# Patient Record
Sex: MALE | Race: BLACK OR AFRICAN AMERICAN | NOT HISPANIC OR LATINO | URBAN - METROPOLITAN AREA
[De-identification: names, ages, dates, MRNs, and addresses within clinical notes are randomized per-mention and may not be internally consistent; named-entity substitution may affect disease eponyms.]

---

## 2018-12-30 ENCOUNTER — EMERGENCY (EMERGENCY)
Facility: HOSPITAL | Age: 63
LOS: 1 days | Discharge: HOME | End: 2018-12-30
Attending: EMERGENCY MEDICINE | Admitting: EMERGENCY MEDICINE
Payer: COMMERCIAL

## 2018-12-30 VITALS
OXYGEN SATURATION: 98 % | HEART RATE: 85 BPM | DIASTOLIC BLOOD PRESSURE: 92 MMHG | TEMPERATURE: 98 F | SYSTOLIC BLOOD PRESSURE: 131 MMHG | RESPIRATION RATE: 18 BRPM

## 2018-12-30 VITALS
DIASTOLIC BLOOD PRESSURE: 88 MMHG | SYSTOLIC BLOOD PRESSURE: 141 MMHG | RESPIRATION RATE: 16 BRPM | OXYGEN SATURATION: 100 % | HEART RATE: 74 BPM

## 2018-12-30 LAB
ALBUMIN SERPL ELPH-MCNC: 4.5 G/DL — SIGNIFICANT CHANGE UP (ref 3.3–5)
ALP SERPL-CCNC: 72 U/L — SIGNIFICANT CHANGE UP (ref 40–120)
ALT FLD-CCNC: 25 U/L — SIGNIFICANT CHANGE UP (ref 4–41)
ANISOCYTOSIS BLD QL: SLIGHT — SIGNIFICANT CHANGE UP
AST SERPL-CCNC: 20 U/L — SIGNIFICANT CHANGE UP (ref 4–40)
BASOPHILS # BLD AUTO: 0.02 K/UL — SIGNIFICANT CHANGE UP (ref 0–0.2)
BASOPHILS NFR BLD AUTO: 0.2 % — SIGNIFICANT CHANGE UP (ref 0–2)
BASOPHILS NFR SPEC: 0.9 % — SIGNIFICANT CHANGE UP (ref 0–2)
BILIRUB SERPL-MCNC: 0.6 MG/DL — SIGNIFICANT CHANGE UP (ref 0.2–1.2)
BLASTS # FLD: 0 % — SIGNIFICANT CHANGE UP (ref 0–0)
BUN SERPL-MCNC: 19 MG/DL — SIGNIFICANT CHANGE UP (ref 7–23)
CALCIUM SERPL-MCNC: 10 MG/DL — SIGNIFICANT CHANGE UP (ref 8.4–10.5)
CHLORIDE SERPL-SCNC: 100 MMOL/L — SIGNIFICANT CHANGE UP (ref 98–107)
CO2 SERPL-SCNC: 24 MMOL/L — SIGNIFICANT CHANGE UP (ref 22–31)
CREAT SERPL-MCNC: 1.11 MG/DL — SIGNIFICANT CHANGE UP (ref 0.5–1.3)
EOSINOPHIL # BLD AUTO: 0.01 K/UL — SIGNIFICANT CHANGE UP (ref 0–0.5)
EOSINOPHIL NFR BLD AUTO: 0.1 % — SIGNIFICANT CHANGE UP (ref 0–6)
EOSINOPHIL NFR FLD: 0 % — SIGNIFICANT CHANGE UP (ref 0–6)
GLUCOSE SERPL-MCNC: 140 MG/DL — HIGH (ref 70–99)
HCT VFR BLD CALC: 42.9 % — SIGNIFICANT CHANGE UP (ref 39–50)
HGB BLD-MCNC: 14.5 G/DL — SIGNIFICANT CHANGE UP (ref 13–17)
IMM GRANULOCYTES # BLD AUTO: 0.03 # — SIGNIFICANT CHANGE UP
IMM GRANULOCYTES NFR BLD AUTO: 0.3 % — SIGNIFICANT CHANGE UP (ref 0–1.5)
LYMPHOCYTES # BLD AUTO: 0.46 K/UL — LOW (ref 1–3.3)
LYMPHOCYTES # BLD AUTO: 3.9 % — LOW (ref 13–44)
LYMPHOCYTES NFR SPEC AUTO: 5.2 % — LOW (ref 13–44)
MCHC RBC-ENTMCNC: 27.3 PG — SIGNIFICANT CHANGE UP (ref 27–34)
MCHC RBC-ENTMCNC: 33.8 % — SIGNIFICANT CHANGE UP (ref 32–36)
MCV RBC AUTO: 80.6 FL — SIGNIFICANT CHANGE UP (ref 80–100)
METAMYELOCYTES # FLD: 0 % — SIGNIFICANT CHANGE UP (ref 0–1)
MICROCYTES BLD QL: SLIGHT — SIGNIFICANT CHANGE UP
MONOCYTES # BLD AUTO: 0.59 K/UL — SIGNIFICANT CHANGE UP (ref 0–0.9)
MONOCYTES NFR BLD AUTO: 5 % — SIGNIFICANT CHANGE UP (ref 2–14)
MONOCYTES NFR BLD: 4.3 % — SIGNIFICANT CHANGE UP (ref 2–9)
MYELOCYTES NFR BLD: 0 % — SIGNIFICANT CHANGE UP (ref 0–0)
NEUTROPHIL AB SER-ACNC: 89.6 % — HIGH (ref 43–77)
NEUTROPHILS # BLD AUTO: 10.8 K/UL — HIGH (ref 1.8–7.4)
NEUTROPHILS NFR BLD AUTO: 90.5 % — HIGH (ref 43–77)
NEUTS BAND # BLD: 0 % — SIGNIFICANT CHANGE UP (ref 0–6)
NRBC # FLD: 0 — SIGNIFICANT CHANGE UP
OTHER - HEMATOLOGY %: 0 — SIGNIFICANT CHANGE UP
OVALOCYTES BLD QL SMEAR: SLIGHT — SIGNIFICANT CHANGE UP
PLATELET # BLD AUTO: 241 K/UL — SIGNIFICANT CHANGE UP (ref 150–400)
PLATELET COUNT - ESTIMATE: NORMAL — SIGNIFICANT CHANGE UP
PMV BLD: 10 FL — SIGNIFICANT CHANGE UP (ref 7–13)
POTASSIUM SERPL-MCNC: 4.4 MMOL/L — SIGNIFICANT CHANGE UP (ref 3.5–5.3)
POTASSIUM SERPL-SCNC: 4.4 MMOL/L — SIGNIFICANT CHANGE UP (ref 3.5–5.3)
PROMYELOCYTES # FLD: 0 % — SIGNIFICANT CHANGE UP (ref 0–0)
PROT SERPL-MCNC: 7.3 G/DL — SIGNIFICANT CHANGE UP (ref 6–8.3)
RBC # BLD: 5.32 M/UL — SIGNIFICANT CHANGE UP (ref 4.2–5.8)
RBC # FLD: 13.2 % — SIGNIFICANT CHANGE UP (ref 10.3–14.5)
REVIEW TO FOLLOW: YES — SIGNIFICANT CHANGE UP
SODIUM SERPL-SCNC: 138 MMOL/L — SIGNIFICANT CHANGE UP (ref 135–145)
VARIANT LYMPHS # BLD: 0 % — SIGNIFICANT CHANGE UP
WBC # BLD: 11.91 K/UL — HIGH (ref 3.8–10.5)
WBC # FLD AUTO: 11.91 K/UL — HIGH (ref 3.8–10.5)

## 2018-12-30 PROCEDURE — 99284 EMERGENCY DEPT VISIT MOD MDM: CPT

## 2018-12-30 PROCEDURE — 70450 CT HEAD/BRAIN W/O DYE: CPT | Mod: 26

## 2018-12-30 RX ORDER — KETOROLAC TROMETHAMINE 30 MG/ML
30 SYRINGE (ML) INJECTION ONCE
Qty: 0 | Refills: 0 | Status: DISCONTINUED | OUTPATIENT
Start: 2018-12-30 | End: 2018-12-30

## 2018-12-30 RX ORDER — SODIUM CHLORIDE 9 MG/ML
1000 INJECTION INTRAMUSCULAR; INTRAVENOUS; SUBCUTANEOUS ONCE
Qty: 0 | Refills: 0 | Status: COMPLETED | OUTPATIENT
Start: 2018-12-30 | End: 2018-12-30

## 2018-12-30 RX ORDER — METOCLOPRAMIDE HCL 10 MG
10 TABLET ORAL ONCE
Qty: 0 | Refills: 0 | Status: COMPLETED | OUTPATIENT
Start: 2018-12-30 | End: 2018-12-30

## 2018-12-30 RX ORDER — DEXAMETHASONE 0.5 MG/5ML
10 ELIXIR ORAL ONCE
Qty: 0 | Refills: 0 | Status: COMPLETED | OUTPATIENT
Start: 2018-12-30 | End: 2018-12-30

## 2018-12-30 RX ORDER — ACETAMINOPHEN 500 MG
650 TABLET ORAL ONCE
Qty: 0 | Refills: 0 | Status: COMPLETED | OUTPATIENT
Start: 2018-12-30 | End: 2018-12-30

## 2018-12-30 RX ORDER — VALPROIC ACID (AS SODIUM SALT) 250 MG/5ML
500 SOLUTION, ORAL ORAL ONCE
Qty: 0 | Refills: 0 | Status: COMPLETED | OUTPATIENT
Start: 2018-12-30 | End: 2018-12-30

## 2018-12-30 RX ADMIN — Medication 30 MILLIGRAM(S): at 15:20

## 2018-12-30 RX ADMIN — Medication 10 MILLIGRAM(S): at 14:01

## 2018-12-30 RX ADMIN — Medication 102 MILLIGRAM(S): at 15:20

## 2018-12-30 RX ADMIN — Medication 650 MILLIGRAM(S): at 14:01

## 2018-12-30 RX ADMIN — Medication 27.5 MILLIGRAM(S): at 16:16

## 2018-12-30 RX ADMIN — SODIUM CHLORIDE 1000 MILLILITER(S): 9 INJECTION INTRAMUSCULAR; INTRAVENOUS; SUBCUTANEOUS at 14:02

## 2018-12-30 NOTE — ED PROVIDER NOTE - ATTENDING CONTRIBUTION TO CARE
I, Jennifer Cabot, MD, have performed a history and physical exam of the patient and discussed their management with the resident. I reviewed the resident's note and agree with the documented findings and plan of care. My medical decision making and observations are found above.    Cabot: 63M with PMH of HTN, carotid artery disease on asa, and migraines, who comes in with orbital HA that started gradually at 2am today.  Similar to prior HAs.  + NBNB vomiting, photophobia.  No neck stiffness, numbness, weakness, LOC.  On exam, HDS, NAD, MMM, eyes clear, lungs CTAB, heart sounds normal, abd soft, NT, ND, no CVAT, LEs without edema, wwp, skin normal temperature and color, neuro: AAOx3, PERRLA, EOMIB, CN 2-12 intact, 5/5 strength, SILT, no drift, VIKAS intact.  Likely migraine, very unlikely ICH or mass.  Given age, will check CT head.  Will tx sx.

## 2018-12-30 NOTE — ED ADULT NURSE NOTE - OBJECTIVE STATEMENT
pt brought to rm 25 A&ox3, skin w/d/i, amb @ baseline, c/o HA associated w/ vomiting/photophobia since approx 3 am,  hx of HA before, similar however not as bad, states has had approx 3 prior episodes usually resolved w/ sleep however was unable to sleep through pain today, denies taking OTC pain meds prior to arrival, states missed multiple days of HTN meds last week however has been taking them for past 4-5 days prior to onset of symptoms, no neuro deficits noted on presentation, wife @ bedside states pt @ baseline health/mental status aside from pain, SL placed, labs sent, awaiting MD eval/results/further orders, will continue to monitor.  (break)

## 2018-12-30 NOTE — ED ADULT TRIAGE NOTE - CHIEF COMPLAINT QUOTE
p/t c/o of sudden onset of headaches, and vomiting since this am with mild photophobia as well, p/t appears uncomfortable

## 2018-12-30 NOTE — ED PROVIDER NOTE - MEDICAL DECISION MAKING DETAILS
62yo M h/o HTN, carotid artery disease on asa p/w HA and vomiting. unremarkable exam. likely migraine however given older age and severity of HA, will get CT to eval for masses. laabs, symptomatic control. 62yo M h/o HTN, carotid artery disease on asa p/w HA and vomiting. unremarkable exam. likely migraine however given older age and severity of HA, will get CT to eval for masses. labs, symptomatic control.    Cabot: 63M with PMH of HTN, carotid artery disease on asa, and migraines, who comes in with orbital HA that started gradually at 2am today.  Similar to prior HAs.  + NBNB vomiting, photophobia.  No neck stiffness, numbness, weakness, LOC.  On exam, HDS, NAD, MMM, eyes clear, lungs CTAB, heart sounds normal, abd soft, NT, ND, no CVAT, LEs without edema, wwp, skin normal temperature and color, neuro: AAOx3, PERRLA, EOMIB, CN 2-12 intact, 5/5 strength, SILT, no drift, VIKAS intact.  Likely migraine, very unlikely ICH or mass.  Given age, will check CT head.  Will tx sx.

## 2018-12-30 NOTE — ED PROVIDER NOTE - NS ED ROS FT
Constitutional: no fevers, chills  HEENT: +HA, no visual changes, no sore throat, no rhinorrhea  CV: no cp  Resp: no sob  GI: +vomiting, +vomiting, no abd pain/diarrhea/constipation  : no dysuria, hematuria  MSK: no joint pains  skin: no rashes  neuro: no HA, no confusion  psych: no SI/HI  heme: no LAD

## 2018-12-30 NOTE — ED PROVIDER NOTE - NSFOLLOWUPINSTRUCTIONS_ED_ALL_ED_FT
1) Please follow-up with a neurologist within the next 3 days.  Please call today or tomorrow for an appointment.  If you cannot follow-up with your doctor(s), please return to the ED for any urgent issues.  2) If you have any worsening of symptoms or any other concerns please return to the ED immediately.  3) Please continue taking your home medications as directed.  4) You may have been given a copy of your labs and/or imaging.  Please go over these with your primary care doctor.

## 2018-12-30 NOTE — ED PROVIDER NOTE - OBJECTIVE STATEMENT
64yo M h/o HTN, carotid artery disease on asa p/w HA and vomiting. HA is behind the eyes, started acutely, + photophobia, + vomiting. Has had h/o HA before, similar in nature but not as severe. Usually able to sleep it off. Hasn't taken anything for the pain. Denies f/c, neck pain, numb/tingling/weakness, slurred speech, confusion, head trauma. 62yo M h/o HTN, carotid artery disease on asa, and migraines, p/w HA and vomiting. HA is behind the eyes, started acutely, + photophobia, + vomiting. Has had h/o HA before, similar in nature. Usually able to sleep it off. Hasn't taken anything for the pain. Denies f/c, neck pain, numb/tingling/weakness, slurred speech, confusion, head trauma.

## 2018-12-30 NOTE — ED PROVIDER NOTE - PHYSICAL EXAMINATION
Vitals: WNL  Gen: laying comfortably in NAD  Head: NCAT  ENT: sclerae white, anicterus, moist mucous membranes. No exudates. Neck supple, no LAD,  no carotid bruits, no JVD  CV: RRR. Audible S1 and S2. No murmurs, rubs, gallops, S3, nor S4, 2+ radial and DP pulses   Pulm: Clear to auscultation bilaterally. No wheezes, rales, or rhonchi  Abd: soft, normoactive BS x4, NTND, no rebound, no guarding, no rashes  Musculoskeletal:  No peripheral edema  Skin: no lesions or scars noted  Neurologic: AAOx3, CN2-12 intact, EOMI, PERRLA, finger to nose intact, VIKAS intact, gait intact, heel to shin intact  : no CVA tenderness  Psych: normal affect

## 2019-07-01 ENCOUNTER — INPATIENT (INPATIENT)
Facility: HOSPITAL | Age: 64
LOS: 4 days | Discharge: HOME | End: 2019-07-06
Attending: INTERNAL MEDICINE | Admitting: INTERNAL MEDICINE
Payer: COMMERCIAL

## 2019-07-01 VITALS
HEART RATE: 86 BPM | TEMPERATURE: 98 F | RESPIRATION RATE: 16 BRPM | SYSTOLIC BLOOD PRESSURE: 141 MMHG | OXYGEN SATURATION: 100 % | DIASTOLIC BLOOD PRESSURE: 88 MMHG

## 2019-07-01 DIAGNOSIS — Z29.9 ENCOUNTER FOR PROPHYLACTIC MEASURES, UNSPECIFIED: ICD-10-CM

## 2019-07-01 DIAGNOSIS — I10 ESSENTIAL (PRIMARY) HYPERTENSION: ICD-10-CM

## 2019-07-01 DIAGNOSIS — R94.5 ABNORMAL RESULTS OF LIVER FUNCTION STUDIES: ICD-10-CM

## 2019-07-01 DIAGNOSIS — L03.115 CELLULITIS OF RIGHT LOWER LIMB: ICD-10-CM

## 2019-07-01 DIAGNOSIS — E78.5 HYPERLIPIDEMIA, UNSPECIFIED: ICD-10-CM

## 2019-07-01 DIAGNOSIS — L03.90 CELLULITIS, UNSPECIFIED: ICD-10-CM

## 2019-07-01 LAB
ALBUMIN SERPL ELPH-MCNC: 4.3 G/DL — SIGNIFICANT CHANGE UP (ref 3.3–5)
ALP SERPL-CCNC: 192 U/L — HIGH (ref 40–120)
ALT FLD-CCNC: 152 U/L — HIGH (ref 4–41)
ANION GAP SERPL CALC-SCNC: 10 MMO/L — SIGNIFICANT CHANGE UP (ref 7–14)
AST SERPL-CCNC: 97 U/L — HIGH (ref 4–40)
BASOPHILS # BLD AUTO: 0.03 K/UL — SIGNIFICANT CHANGE UP (ref 0–0.2)
BASOPHILS NFR BLD AUTO: 0.3 % — SIGNIFICANT CHANGE UP (ref 0–2)
BILIRUB SERPL-MCNC: 0.4 MG/DL — SIGNIFICANT CHANGE UP (ref 0.2–1.2)
BUN SERPL-MCNC: 12 MG/DL — SIGNIFICANT CHANGE UP (ref 7–23)
CALCIUM SERPL-MCNC: 10 MG/DL — SIGNIFICANT CHANGE UP (ref 8.4–10.5)
CHLORIDE SERPL-SCNC: 99 MMOL/L — SIGNIFICANT CHANGE UP (ref 98–107)
CO2 SERPL-SCNC: 29 MMOL/L — SIGNIFICANT CHANGE UP (ref 22–31)
CREAT SERPL-MCNC: 1.08 MG/DL — SIGNIFICANT CHANGE UP (ref 0.5–1.3)
EOSINOPHIL # BLD AUTO: 0.14 K/UL — SIGNIFICANT CHANGE UP (ref 0–0.5)
EOSINOPHIL NFR BLD AUTO: 1.6 % — SIGNIFICANT CHANGE UP (ref 0–6)
GLUCOSE SERPL-MCNC: 102 MG/DL — HIGH (ref 70–99)
HCT VFR BLD CALC: 40.3 % — SIGNIFICANT CHANGE UP (ref 39–50)
HGB BLD-MCNC: 13.4 G/DL — SIGNIFICANT CHANGE UP (ref 13–17)
IMM GRANULOCYTES NFR BLD AUTO: 0.5 % — SIGNIFICANT CHANGE UP (ref 0–1.5)
LYMPHOCYTES # BLD AUTO: 0.77 K/UL — LOW (ref 1–3.3)
LYMPHOCYTES # BLD AUTO: 8.8 % — LOW (ref 13–44)
MCHC RBC-ENTMCNC: 26.9 PG — LOW (ref 27–34)
MCHC RBC-ENTMCNC: 33.3 % — SIGNIFICANT CHANGE UP (ref 32–36)
MCV RBC AUTO: 80.8 FL — SIGNIFICANT CHANGE UP (ref 80–100)
MONOCYTES # BLD AUTO: 0.9 K/UL — SIGNIFICANT CHANGE UP (ref 0–0.9)
MONOCYTES NFR BLD AUTO: 10.3 % — SIGNIFICANT CHANGE UP (ref 2–14)
NEUTROPHILS # BLD AUTO: 6.89 K/UL — SIGNIFICANT CHANGE UP (ref 1.8–7.4)
NEUTROPHILS NFR BLD AUTO: 78.5 % — HIGH (ref 43–77)
NRBC # FLD: 0 K/UL — SIGNIFICANT CHANGE UP (ref 0–0)
PLATELET # BLD AUTO: 246 K/UL — SIGNIFICANT CHANGE UP (ref 150–400)
PMV BLD: 10.2 FL — SIGNIFICANT CHANGE UP (ref 7–13)
POTASSIUM SERPL-MCNC: 4.1 MMOL/L — SIGNIFICANT CHANGE UP (ref 3.5–5.3)
POTASSIUM SERPL-SCNC: 4.1 MMOL/L — SIGNIFICANT CHANGE UP (ref 3.5–5.3)
PROT SERPL-MCNC: 7.2 G/DL — SIGNIFICANT CHANGE UP (ref 6–8.3)
RBC # BLD: 4.99 M/UL — SIGNIFICANT CHANGE UP (ref 4.2–5.8)
RBC # FLD: 13.2 % — SIGNIFICANT CHANGE UP (ref 10.3–14.5)
SODIUM SERPL-SCNC: 138 MMOL/L — SIGNIFICANT CHANGE UP (ref 135–145)
WBC # BLD: 8.77 K/UL — SIGNIFICANT CHANGE UP (ref 3.8–10.5)
WBC # FLD AUTO: 8.77 K/UL — SIGNIFICANT CHANGE UP (ref 3.8–10.5)

## 2019-07-01 PROCEDURE — 99223 1ST HOSP IP/OBS HIGH 75: CPT | Mod: AI

## 2019-07-01 PROCEDURE — 73630 X-RAY EXAM OF FOOT: CPT | Mod: 26,RT

## 2019-07-01 RX ORDER — LOSARTAN POTASSIUM 100 MG/1
1 TABLET, FILM COATED ORAL
Qty: 0 | Refills: 0 | DISCHARGE

## 2019-07-01 RX ORDER — ASPIRIN/CALCIUM CARB/MAGNESIUM 324 MG
1 TABLET ORAL
Qty: 0 | Refills: 0 | DISCHARGE

## 2019-07-01 RX ORDER — ROSUVASTATIN CALCIUM 5 MG/1
1 TABLET ORAL
Qty: 0 | Refills: 0 | DISCHARGE

## 2019-07-01 RX ORDER — HYDROCHLOROTHIAZIDE 25 MG
12.5 TABLET ORAL DAILY
Refills: 0 | Status: DISCONTINUED | OUTPATIENT
Start: 2019-07-01 | End: 2019-07-06

## 2019-07-01 RX ORDER — LOSARTAN POTASSIUM 100 MG/1
50 TABLET, FILM COATED ORAL DAILY
Refills: 0 | Status: DISCONTINUED | OUTPATIENT
Start: 2019-07-01 | End: 2019-07-06

## 2019-07-01 RX ORDER — PREGABALIN 225 MG/1
1000 CAPSULE ORAL DAILY
Refills: 0 | Status: DISCONTINUED | OUTPATIENT
Start: 2019-07-01 | End: 2019-07-06

## 2019-07-01 RX ORDER — ATORVASTATIN CALCIUM 80 MG/1
40 TABLET, FILM COATED ORAL AT BEDTIME
Refills: 0 | Status: DISCONTINUED | OUTPATIENT
Start: 2019-07-01 | End: 2019-07-02

## 2019-07-01 RX ORDER — CHOLECALCIFEROL (VITAMIN D3) 125 MCG
1 CAPSULE ORAL
Qty: 0 | Refills: 0 | DISCHARGE

## 2019-07-01 RX ORDER — HEPARIN SODIUM 5000 [USP'U]/ML
5000 INJECTION INTRAVENOUS; SUBCUTANEOUS EVERY 8 HOURS
Refills: 0 | Status: DISCONTINUED | OUTPATIENT
Start: 2019-07-01 | End: 2019-07-06

## 2019-07-01 RX ORDER — PREGABALIN 225 MG/1
1 CAPSULE ORAL
Qty: 0 | Refills: 0 | DISCHARGE

## 2019-07-01 RX ORDER — TETANUS TOXOID, REDUCED DIPHTHERIA TOXOID AND ACELLULAR PERTUSSIS VACCINE, ADSORBED 5; 2.5; 8; 8; 2.5 [IU]/.5ML; [IU]/.5ML; UG/.5ML; UG/.5ML; UG/.5ML
0.5 SUSPENSION INTRAMUSCULAR ONCE
Refills: 0 | Status: COMPLETED | OUTPATIENT
Start: 2019-07-01 | End: 2019-07-01

## 2019-07-01 RX ORDER — ASPIRIN/CALCIUM CARB/MAGNESIUM 324 MG
81 TABLET ORAL DAILY
Refills: 0 | Status: DISCONTINUED | OUTPATIENT
Start: 2019-07-01 | End: 2019-07-06

## 2019-07-01 RX ADMIN — TETANUS TOXOID, REDUCED DIPHTHERIA TOXOID AND ACELLULAR PERTUSSIS VACCINE, ADSORBED 0.5 MILLILITER(S): 5; 2.5; 8; 8; 2.5 SUSPENSION INTRAMUSCULAR at 12:14

## 2019-07-01 RX ADMIN — HEPARIN SODIUM 5000 UNIT(S): 5000 INJECTION INTRAVENOUS; SUBCUTANEOUS at 21:37

## 2019-07-01 RX ADMIN — Medication 100 MILLIGRAM(S): at 10:48

## 2019-07-01 RX ADMIN — Medication 100 MILLIGRAM(S): at 21:34

## 2019-07-01 NOTE — ED PROVIDER NOTE - CLINICAL SUMMARY MEDICAL DECISION MAKING FREE TEXT BOX
Pt Pt p/w worsening likely cellulitis over R foot, expanding in size and edema despite 3 days of PO antibx. Pt does not have DM or other immunocompromise but in setting of failutre of o/p tx, will likely require IV antibx. CDU vs admission. Pt appears otherwise well, does not appear septic, vitals stable. Basic blood to be order work but sepsis w/u not necessary at this time.

## 2019-07-01 NOTE — H&P ADULT - NSHPSOCIALHISTORY_GEN_ALL_CORE
Employed as an , Lives with wife and children, ambulates without asisstance, independently attends to adls, denies EtOH, and tobacco use. Employed as an , Lives with wife and children, ambulates without assistance, independently attends to adls, denies EtOH, and tobacco use.

## 2019-07-01 NOTE — ED PROVIDER NOTE - ATTENDING CONTRIBUTION TO CARE
I, Jennifer Cabot, MD, have performed a history and physical exam of the patient and discussed their management with the resident. I reviewed the resident's note and agree with the documented findings and plan of care. My medical decision making and observations are found above.    Cabot: 67M with PMH of HTN, carotid artery disease on asa, and migraines, here with worsening R foot edema, erythema, and pain x 5d.  Started with small abrasion between 4th and 5th toes, no known trauma.  Has been on keflex without improvement.  No F/C.  On exam, HDS, NAD, MMM, eyes clear, lungs CTAB, heart sounds normal, abd soft, NT, ND, no CVAT, RLE - erythema and edema from foot to midshin, warm to touch, no abrasions or lacerations on the foot noted, LLE without edema, wwp, skin normal temperature and color, neuro: alert and oriented, no focal deficits.  WIll treat with IV clinda, check XR, give tdap, admit.

## 2019-07-01 NOTE — ED PROVIDER NOTE - OBJECTIVE STATEMENT
62yo M h/o HTN, carotid artery disease on asa, and migraines, p/w worsening erythema and swelling of L foot. Pt has been on Keflex for the last 3 days, prescribed by PMD; the rash has continued to grow, alongside the swelling. Small amount of edema of dorsal surface of foot with spread laterally and superiorly. Pt with mild pain to area. Unknown if foot sustained trauma prior to development of rash; pt states the rash began 4-5 days ago. Pt denies systemic symptoms. 64yo M h/o HTN, carotid artery disease on asa, and migraines, p/w worsening erythema and swelling of R foot. Pt has been on Keflex for the last 3 days, prescribed by PMD; the rash has continued to grow, alongside the swelling. Small amount of edema of dorsal surface of foot with spread laterally and superiorly. Pt with mild pain to area. Unknown if foot sustained trauma prior to development of rash; pt states the rash began 4-5 days ago. Pt denies systemic symptoms.

## 2019-07-01 NOTE — H&P ADULT - NSHPPHYSICALEXAM_GEN_ALL_CORE
Vital Signs Last 24 Hrs  T(C): 36.8 (01 Jul 2019 12:58), Max: 36.9 (01 Jul 2019 09:41)  T(F): 98.3 (01 Jul 2019 12:58), Max: 98.4 (01 Jul 2019 09:41)  HR: 73 (01 Jul 2019 12:58) (73 - 86)  BP: 122/87 (01 Jul 2019 12:58) (122/87 - 141/88)  BP(mean): --  RR: 17 (01 Jul 2019 12:58) (16 - 17)  SpO2: 100% (01 Jul 2019 12:58) (100% - 100%)  I&O's Summary      PHYSICAL EXAM:  GENERAL: NAD, well-groomed, well-developed  HEAD:  Atraumatic, Normocephalic  EYES: EOMI, PERRLA, conjunctiva and sclera clear  ENMT: No tonsillar erythema, exudates, or enlargement; Moist mucous membranes, Good dentition  NECK: Supple, No JVD  NERVOUS SYSTEM: AOX3, motor and sensation grossly intact in b/l UE and b/l LE  PSYCHIATRIC: Appropriate affect and mood  CHEST/LUNG: Clear to auscultation bilaterally; No rales, rhonchi, wheezing, or rubs  HEART: Regular rate and rhythm; No murmurs, rubs, or gallops. No LE edema  ABDOMEN: Soft, Nontender, Nondistended; Bowel sounds present  EXTREMITIES:  2+ Peripheral Pulses, No clubbing, cyanosis  SKIN: No rashes or lesions Vital Signs Last 24 Hrs  T(C): 36.8 (01 Jul 2019 12:58), Max: 36.9 (01 Jul 2019 09:41)  T(F): 98.3 (01 Jul 2019 12:58), Max: 98.4 (01 Jul 2019 09:41)  HR: 73 (01 Jul 2019 12:58) (73 - 86)  BP: 122/87 (01 Jul 2019 12:58) (122/87 - 141/88)  RR: 17 (01 Jul 2019 12:58) (16 - 17)  SpO2: 100% (01 Jul 2019 12:58) (100% - 100%)    PHYSICAL EXAM:  GENERAL: NAD, well-groomed, well-developed  HEAD:  Atraumatic, Normocephalic  EYES: EOMI, PERRLA, conjunctiva and sclera clear  ENMT: No tonsillar erythema, exudates, or enlargement; Moist mucous membranes, Good dentition  NECK: Supple, No JVD  NERVOUS SYSTEM: AOX3, motor and sensation grossly intact in b/l UE and b/l LE  PSYCHIATRIC: Appropriate affect and mood  CHEST/LUNG: Clear to auscultation bilaterally; No rales, rhonchi, wheezing, or rubs  HEART: Regular rate and rhythm; No murmurs, rubs, or gallops. No LE edema  ABDOMEN: Soft, Nontender, Nondistended; Bowel sounds present  EXTREMITIES:  2+ Peripheral Pulses, No clubbing, cyanosis  SKIN: RLE erythematous, warm to touch, non tender, grossly swollen

## 2019-07-01 NOTE — H&P ADULT - PROBLEM SELECTOR PLAN 4
Patient with elevated Alk Phos, AST/ALT unclear etiology  - will Trend CMP  - will defer further work up as outpatient Patient with elevated Alk Phos, AST/ALT unclear etiology  - will Trend CMP, if continues to rise consider Abdominal US   - otherwise will defer further work up as outpatient

## 2019-07-01 NOTE — ED PROVIDER NOTE - PROGRESS NOTE DETAILS
CDU full, pt likely does require IV antibx for worsening of cellulitis despite o/p tx, pt now tba for tx -Tamika ,pgy3

## 2019-07-01 NOTE — H&P ADULT - NSHPREVIEWOFSYSTEMS_GEN_ALL_CORE
CONSTITUTIONAL: No fever, weight loss, or fatigue  EYES: No eye pain, visual disturbances, or discharge  ENMT:  No difficulty hearing, tinnitus, vertigo; No sinus or throat pain  RESPIRATORY: No cough, wheezing, chills or hemoptysis; No shortness of breath  CARDIOVASCULAR: No chest pain, palpitations, dizziness, or leg swelling  GASTROINTESTINAL: No abdominal or epigastric pain. No nausea, vomiting, or hematemesis; No diarrhea or constipation. No melena or hematochezia.  GENITOURINARY: No dysuria, frequency, hematuria, or incontinence  NEUROLOGICAL: No headaches, loss of strength, numbness, or tremors  SKIN: No itching, burning, rashes, or lesions   LYMPH NODES: No enlarged glands  ENDOCRINE: No heat or cold intolerance; No polydipsia or polyuria  MUSCULOSKELETAL: No joint pain or swelling;   PSYCHIATRIC: Denies depression, anxiety  HEME/LYMPH: No easy bruising, or bleeding gums  ALLERGY AND IMMUNOLOGIC: No hives or eczema CONSTITUTIONAL: No fever, weight loss, or fatigue  EYES: No eye pain, visual disturbances, or discharge  ENMT: No difficulty hearing, tinnitus, vertigo; No sinus or throat pain  RESPIRATORY: No cough, wheezing, chills or hemoptysis; No shortness of breath  CARDIOVASCULAR: No chest pain, palpitations, dizziness  GASTROINTESTINAL: No abdominal or epigastric pain. No nausea, vomiting, or hematemesis; No diarrhea or constipation. No melena or hematochezia.  GENITOURINARY: No dysuria, frequency, hematuria, or incontinence  NEUROLOGICAL: No headaches, loss of strength, numbness, or tremors  SKIN: Erythema, pain, and swelling of the RLE   LYMPH NODES: No enlarged glands  ENDOCRINE: No heat or cold intolerance; No polydipsia or polyuria  MUSCULOSKELETAL: No joint pain or swelling;   PSYCHIATRIC: Denies depression, anxiety  HEME/LYMPH: No easy bruising, or bleeding gums  ALLERGY AND IMMUNOLOGIC: No hives or eczema

## 2019-07-01 NOTE — H&P ADULT - PROBLEM SELECTOR PLAN 1
S/P 3 days of keflex with reported non improvement   s/p 900mg clindamycin x1   - c/w S/P 3 days of keflex with reported non improvement   Xray without foci of air or bony destruction   s/p 900mg clindamycin x1 in ED  - c/w clindamycin 900mg IV Q8hrs   - can transition to PO with clinical improvement

## 2019-07-01 NOTE — ED ADULT NURSE NOTE - OBJECTIVE STATEMENT
64 yo male, ambulatory, c/o 3 days redness, warmth, swelling, pain to right foot. foot appears tight, swollen, warm to touch. pt reports having minor skin breakdown between toes and after that redness/swelling began. pt denies fever, chest pain, sob, n/v/d, dysuria. 20g iv insert to right ac. labs sent as ordered. pt to be medicated w IV ABX

## 2019-07-01 NOTE — H&P ADULT - HISTORY OF PRESENT ILLNESS
64 yo M PMHx of HTN, CAD?, migraines, 62 yo M PMHx of HTN, CAD?, migraines presenting with RLE pain and swelling for 5 days. He presented to his PMD last week with RLE pain and swelling, erythema; started on Keflex which he continued for 3 days. Denies loss of sensation and/or muscle weakness. Sent into ED for further eval by PMD. 62 yo M PMHx of HTN, CAD?, migraines presenting with RLE pain and swelling for 5 days. He presented to his PMD last week with RLE pain and swelling, erythema; started on Keflex which he continued for 3 days. Denies loss of sensation and/or muscle weakness, further denies trauma to the affected joint. No fevers/chills, or sick contacts. Sent into ED for further eval by PMD due to non improvement. Reports that his RLE swelling is markedly improved compared to how it looked 4 days ago.     In the ED  VS: Afebrile, HR 73-86, -147/88, RR 17, spO2 100% RA   Clindamycin 900mg IV x1 64 yo M PMHx of HTN, Carotid artery disease, migraines presenting with RLE pain and swelling for 5 days. He presented to his PMD last week with RLE pain and swelling, erythema; started on Keflex which he continued for 3 days. Denies loss of sensation and/or muscle weakness, further denies trauma to the affected joint. No fevers/chills, or sick contacts. Sent into ED for further eval by PMD due to non improvement. Reports that his RLE swelling is markedly improved compared to how it looked 4 days ago.     In the ED  VS: Afebrile, HR 73-86, -147/88, RR 17, spO2 100% RA   Clindamycin 900mg IV x1, tdap vaccine given

## 2019-07-01 NOTE — H&P ADULT - ATTENDING COMMENTS
63M w/PMH of carotid artery disease (ASA), HTN, migraines sent in by PMD for R foot cellulitis after failing PO Keflex.     #R foot cellulitis: edema, erythema and mild TTP of area. Xray w/out air or bony destruction.   -C/w IV Clindamycin, can transition to PO abx upon dc   -Tdap given in ED     #Elevated LFTs: new this admission   -Trend CMP  -Obtain collateral from PMD regarding Abdominal US and workup    Discussed plan of care with patient’s PMD, Dr. Bliss. 63M w/PMH of carotid artery disease (ASA), HTN, migraines sent in by PMD for R foot cellulitis after failing PO Keflex.     #R foot cellulitis: edema, erythema and mild TTP of area, ROM fully intact, sensation intact. Xray w/out air or bony destruction.   -C/w IV Clindamycin, can transition to PO abx upon dc   -Tdap given in ED     #Elevated LFTs: new this admission   -Trend CMP  -Obtain collateral from PMD regarding Abdominal US and workup    Discussed plan of care with patient’s PMD, Dr. Bliss.

## 2019-07-01 NOTE — H&P ADULT - NSICDXPASTMEDICALHX_GEN_ALL_CORE_FT
PAST MEDICAL HISTORY:  No pertinent past medical history PAST MEDICAL HISTORY:  HLD (hyperlipidemia)     Hypertension

## 2019-07-01 NOTE — ED ADULT NURSE REASSESSMENT NOTE - NS ED NURSE REASSESS COMMENT FT1
Pt received resting in stretcher. Was viewed ambulating without discomfort. Pt states he currently has no pain in his right foot after the medications he received. Will continue to monitor.

## 2019-07-01 NOTE — ED ADULT TRIAGE NOTE - CHIEF COMPLAINT QUOTE
Pt sent in by MD for IV antibiotics, 3 days into PO antibiotics for right foot cellulitis. Denies fever/chills/nvd/DM.

## 2019-07-01 NOTE — H&P ADULT - NSHPLABSRESULTS_GEN_ALL_CORE
LABS:                        13.4   8.77  )-----------( 246      ( 01 Jul 2019 10:40 )             40.3     01 Jul 2019 10:40    138    |  99     |  12     ----------------------------<  102    4.1     |  29     |  1.08     Ca    10.0       01 Jul 2019 10:40    TPro  7.2    /  Alb  4.3    /  TBili  0.4    /  DBili  x      /  AST  97     /  ALT  152    /  AlkPhos  192    01 Jul 2019 10:40      CAPILLARY BLOOD GLUCOSE        BLOOD CULTURE    RADIOLOGY & ADDITIONAL TESTS:    Imaging Personally Reviewed:  [ ] YES LABS:                        13.4   8.77  )-----------( 246      ( 01 Jul 2019 10:40 )             40.3     01 Jul 2019 10:40    138    |  99     |  12     ----------------------------<  102    4.1     |  29     |  1.08     Ca    10.0       01 Jul 2019 10:40    TPro  7.2    /  Alb  4.3    /  TBili  0.4    /  DBili  x      /  AST  97     /  ALT  152    /  AlkPhos  192    01 Jul 2019 10:40    RADIOLOGY & ADDITIONAL TESTS:  < from: Xray Foot AP + Lateral + Oblique, Right (07.01.19 @ 12:08) >    INTERPRETATION:   There is no acute fracture or dislocation. No subcutaneous air or bone   destruction to indicate osteomyelitis. Soft tissues are intact.  COMPARISON:  None available  IMPRESSION:  No acute bone or joint disease.  < end of copied text >    Imaging Personally Reviewed:  [x] YES

## 2019-07-02 LAB
ALBUMIN SERPL ELPH-MCNC: 3.7 G/DL — SIGNIFICANT CHANGE UP (ref 3.3–5)
ALP SERPL-CCNC: 179 U/L — HIGH (ref 40–120)
ALT FLD-CCNC: 104 U/L — HIGH (ref 4–41)
ANION GAP SERPL CALC-SCNC: 11 MMO/L — SIGNIFICANT CHANGE UP (ref 7–14)
AST SERPL-CCNC: 46 U/L — HIGH (ref 4–40)
BASOPHILS # BLD AUTO: 0.02 K/UL — SIGNIFICANT CHANGE UP (ref 0–0.2)
BASOPHILS NFR BLD AUTO: 0.3 % — SIGNIFICANT CHANGE UP (ref 0–2)
BILIRUB SERPL-MCNC: 0.7 MG/DL — SIGNIFICANT CHANGE UP (ref 0.2–1.2)
BUN SERPL-MCNC: 11 MG/DL — SIGNIFICANT CHANGE UP (ref 7–23)
CALCIUM SERPL-MCNC: 9.9 MG/DL — SIGNIFICANT CHANGE UP (ref 8.4–10.5)
CHLORIDE SERPL-SCNC: 99 MMOL/L — SIGNIFICANT CHANGE UP (ref 98–107)
CO2 SERPL-SCNC: 26 MMOL/L — SIGNIFICANT CHANGE UP (ref 22–31)
CREAT SERPL-MCNC: 0.97 MG/DL — SIGNIFICANT CHANGE UP (ref 0.5–1.3)
EOSINOPHIL # BLD AUTO: 0.2 K/UL — SIGNIFICANT CHANGE UP (ref 0–0.5)
EOSINOPHIL NFR BLD AUTO: 2.8 % — SIGNIFICANT CHANGE UP (ref 0–6)
GLUCOSE SERPL-MCNC: 101 MG/DL — HIGH (ref 70–99)
HCT VFR BLD CALC: 39.1 % — SIGNIFICANT CHANGE UP (ref 39–50)
HCV AB S/CO SERPL IA: 0.09 S/CO — SIGNIFICANT CHANGE UP (ref 0–0.99)
HCV AB SERPL-IMP: SIGNIFICANT CHANGE UP
HGB BLD-MCNC: 13 G/DL — SIGNIFICANT CHANGE UP (ref 13–17)
IMM GRANULOCYTES NFR BLD AUTO: 0.4 % — SIGNIFICANT CHANGE UP (ref 0–1.5)
LYMPHOCYTES # BLD AUTO: 0.74 K/UL — LOW (ref 1–3.3)
LYMPHOCYTES # BLD AUTO: 10.5 % — LOW (ref 13–44)
MAGNESIUM SERPL-MCNC: 2 MG/DL — SIGNIFICANT CHANGE UP (ref 1.6–2.6)
MCHC RBC-ENTMCNC: 27 PG — SIGNIFICANT CHANGE UP (ref 27–34)
MCHC RBC-ENTMCNC: 33.2 % — SIGNIFICANT CHANGE UP (ref 32–36)
MCV RBC AUTO: 81.1 FL — SIGNIFICANT CHANGE UP (ref 80–100)
MONOCYTES # BLD AUTO: 0.83 K/UL — SIGNIFICANT CHANGE UP (ref 0–0.9)
MONOCYTES NFR BLD AUTO: 11.8 % — SIGNIFICANT CHANGE UP (ref 2–14)
NEUTROPHILS # BLD AUTO: 5.21 K/UL — SIGNIFICANT CHANGE UP (ref 1.8–7.4)
NEUTROPHILS NFR BLD AUTO: 74.2 % — SIGNIFICANT CHANGE UP (ref 43–77)
NRBC # FLD: 0 K/UL — SIGNIFICANT CHANGE UP (ref 0–0)
PHOSPHATE SERPL-MCNC: 2.7 MG/DL — SIGNIFICANT CHANGE UP (ref 2.5–4.5)
PLATELET # BLD AUTO: 227 K/UL — SIGNIFICANT CHANGE UP (ref 150–400)
PMV BLD: 10.1 FL — SIGNIFICANT CHANGE UP (ref 7–13)
POTASSIUM SERPL-MCNC: 4.9 MMOL/L — SIGNIFICANT CHANGE UP (ref 3.5–5.3)
POTASSIUM SERPL-SCNC: 4.9 MMOL/L — SIGNIFICANT CHANGE UP (ref 3.5–5.3)
PROT SERPL-MCNC: 6.5 G/DL — SIGNIFICANT CHANGE UP (ref 6–8.3)
RBC # BLD: 4.82 M/UL — SIGNIFICANT CHANGE UP (ref 4.2–5.8)
RBC # FLD: 12.9 % — SIGNIFICANT CHANGE UP (ref 10.3–14.5)
SODIUM SERPL-SCNC: 136 MMOL/L — SIGNIFICANT CHANGE UP (ref 135–145)
WBC # BLD: 7.03 K/UL — SIGNIFICANT CHANGE UP (ref 3.8–10.5)
WBC # FLD AUTO: 7.03 K/UL — SIGNIFICANT CHANGE UP (ref 3.8–10.5)

## 2019-07-02 RX ORDER — LACTOBACILLUS ACIDOPHILUS 100MM CELL
1 CAPSULE ORAL
Refills: 0 | Status: DISCONTINUED | OUTPATIENT
Start: 2019-07-02 | End: 2019-07-06

## 2019-07-02 RX ADMIN — HEPARIN SODIUM 5000 UNIT(S): 5000 INJECTION INTRAVENOUS; SUBCUTANEOUS at 13:35

## 2019-07-02 RX ADMIN — Medication 100 MILLIGRAM(S): at 13:33

## 2019-07-02 RX ADMIN — PREGABALIN 1000 MICROGRAM(S): 225 CAPSULE ORAL at 13:33

## 2019-07-02 RX ADMIN — Medication 12.5 MILLIGRAM(S): at 06:01

## 2019-07-02 RX ADMIN — Medication 81 MILLIGRAM(S): at 13:34

## 2019-07-02 RX ADMIN — Medication 1 TABLET(S): at 17:32

## 2019-07-02 RX ADMIN — LOSARTAN POTASSIUM 50 MILLIGRAM(S): 100 TABLET, FILM COATED ORAL at 06:01

## 2019-07-02 RX ADMIN — Medication 100 MILLIGRAM(S): at 06:03

## 2019-07-02 RX ADMIN — HEPARIN SODIUM 5000 UNIT(S): 5000 INJECTION INTRAVENOUS; SUBCUTANEOUS at 06:01

## 2019-07-03 LAB
ALBUMIN SERPL ELPH-MCNC: 3.8 G/DL — SIGNIFICANT CHANGE UP (ref 3.3–5)
ALP SERPL-CCNC: 185 U/L — HIGH (ref 40–120)
ALT FLD-CCNC: 88 U/L — HIGH (ref 4–41)
AST SERPL-CCNC: 40 U/L — SIGNIFICANT CHANGE UP (ref 4–40)
BILIRUB DIRECT SERPL-MCNC: < 0.2 MG/DL — SIGNIFICANT CHANGE UP (ref 0.1–0.2)
BILIRUB SERPL-MCNC: 0.4 MG/DL — SIGNIFICANT CHANGE UP (ref 0.2–1.2)
GLUCOSE BLDC GLUCOMTR-MCNC: 79 MG/DL — SIGNIFICANT CHANGE UP (ref 70–99)
HAV IGM SER-ACNC: NONREACTIVE — SIGNIFICANT CHANGE UP
HBV CORE IGM SER-ACNC: NONREACTIVE — SIGNIFICANT CHANGE UP
HBV SURFACE AG SER-ACNC: NONREACTIVE — SIGNIFICANT CHANGE UP
HCV AB S/CO SERPL IA: 0.08 S/CO — SIGNIFICANT CHANGE UP (ref 0–0.99)
HCV AB SERPL-IMP: SIGNIFICANT CHANGE UP
PROT SERPL-MCNC: 6.7 G/DL — SIGNIFICANT CHANGE UP (ref 6–8.3)

## 2019-07-03 PROCEDURE — 76705 ECHO EXAM OF ABDOMEN: CPT | Mod: 26

## 2019-07-03 RX ADMIN — HEPARIN SODIUM 5000 UNIT(S): 5000 INJECTION INTRAVENOUS; SUBCUTANEOUS at 13:57

## 2019-07-03 RX ADMIN — PREGABALIN 1000 MICROGRAM(S): 225 CAPSULE ORAL at 13:58

## 2019-07-03 RX ADMIN — Medication 1 TABLET(S): at 09:00

## 2019-07-03 RX ADMIN — Medication 100 MILLIGRAM(S): at 23:16

## 2019-07-03 RX ADMIN — Medication 100 MILLIGRAM(S): at 05:43

## 2019-07-03 RX ADMIN — Medication 1 TABLET(S): at 18:01

## 2019-07-03 RX ADMIN — Medication 81 MILLIGRAM(S): at 13:58

## 2019-07-03 RX ADMIN — LOSARTAN POTASSIUM 50 MILLIGRAM(S): 100 TABLET, FILM COATED ORAL at 05:43

## 2019-07-03 RX ADMIN — Medication 100 MILLIGRAM(S): at 13:58

## 2019-07-03 RX ADMIN — HEPARIN SODIUM 5000 UNIT(S): 5000 INJECTION INTRAVENOUS; SUBCUTANEOUS at 05:43

## 2019-07-03 RX ADMIN — HEPARIN SODIUM 5000 UNIT(S): 5000 INJECTION INTRAVENOUS; SUBCUTANEOUS at 00:05

## 2019-07-03 RX ADMIN — Medication 100 MILLIGRAM(S): at 00:05

## 2019-07-03 RX ADMIN — Medication 12.5 MILLIGRAM(S): at 05:43

## 2019-07-03 RX ADMIN — HEPARIN SODIUM 5000 UNIT(S): 5000 INJECTION INTRAVENOUS; SUBCUTANEOUS at 23:16

## 2019-07-04 RX ADMIN — Medication 1 TABLET(S): at 17:10

## 2019-07-04 RX ADMIN — PREGABALIN 1000 MICROGRAM(S): 225 CAPSULE ORAL at 11:24

## 2019-07-04 RX ADMIN — Medication 100 MILLIGRAM(S): at 13:35

## 2019-07-04 RX ADMIN — HEPARIN SODIUM 5000 UNIT(S): 5000 INJECTION INTRAVENOUS; SUBCUTANEOUS at 05:33

## 2019-07-04 RX ADMIN — Medication 100 MILLIGRAM(S): at 05:33

## 2019-07-04 RX ADMIN — Medication 81 MILLIGRAM(S): at 11:24

## 2019-07-04 RX ADMIN — HEPARIN SODIUM 5000 UNIT(S): 5000 INJECTION INTRAVENOUS; SUBCUTANEOUS at 13:35

## 2019-07-04 RX ADMIN — Medication 100 MILLIGRAM(S): at 21:25

## 2019-07-04 RX ADMIN — LOSARTAN POTASSIUM 50 MILLIGRAM(S): 100 TABLET, FILM COATED ORAL at 05:34

## 2019-07-04 RX ADMIN — Medication 1 TABLET(S): at 08:40

## 2019-07-04 RX ADMIN — HEPARIN SODIUM 5000 UNIT(S): 5000 INJECTION INTRAVENOUS; SUBCUTANEOUS at 21:25

## 2019-07-04 RX ADMIN — Medication 12.5 MILLIGRAM(S): at 05:34

## 2019-07-05 ENCOUNTER — TRANSCRIPTION ENCOUNTER (OUTPATIENT)
Age: 64
End: 2019-07-05

## 2019-07-05 LAB
ALBUMIN SERPL ELPH-MCNC: 4 G/DL — SIGNIFICANT CHANGE UP (ref 3.3–5)
ALP SERPL-CCNC: 179 U/L — HIGH (ref 40–120)
ALT FLD-CCNC: 92 U/L — HIGH (ref 4–41)
ANA PAT FLD IF-IMP: SIGNIFICANT CHANGE UP
ANA TITR SER: SIGNIFICANT CHANGE UP
ANION GAP SERPL CALC-SCNC: 10 MMO/L — SIGNIFICANT CHANGE UP (ref 7–14)
AST SERPL-CCNC: 59 U/L — HIGH (ref 4–40)
BASOPHILS # BLD AUTO: 0.05 K/UL — SIGNIFICANT CHANGE UP (ref 0–0.2)
BASOPHILS NFR BLD AUTO: 0.7 % — SIGNIFICANT CHANGE UP (ref 0–2)
BILIRUB SERPL-MCNC: 0.3 MG/DL — SIGNIFICANT CHANGE UP (ref 0.2–1.2)
BUN SERPL-MCNC: 21 MG/DL — SIGNIFICANT CHANGE UP (ref 7–23)
CALCIUM SERPL-MCNC: 9.8 MG/DL — SIGNIFICANT CHANGE UP (ref 8.4–10.5)
CHLORIDE SERPL-SCNC: 101 MMOL/L — SIGNIFICANT CHANGE UP (ref 98–107)
CO2 SERPL-SCNC: 25 MMOL/L — SIGNIFICANT CHANGE UP (ref 22–31)
CREAT SERPL-MCNC: 1.04 MG/DL — SIGNIFICANT CHANGE UP (ref 0.5–1.3)
EOSINOPHIL # BLD AUTO: 0.38 K/UL — SIGNIFICANT CHANGE UP (ref 0–0.5)
EOSINOPHIL NFR BLD AUTO: 5.4 % — SIGNIFICANT CHANGE UP (ref 0–6)
GLUCOSE SERPL-MCNC: 103 MG/DL — HIGH (ref 70–99)
HCT VFR BLD CALC: 42.7 % — SIGNIFICANT CHANGE UP (ref 39–50)
HGB BLD-MCNC: 14.1 G/DL — SIGNIFICANT CHANGE UP (ref 13–17)
IMM GRANULOCYTES NFR BLD AUTO: 0.6 % — SIGNIFICANT CHANGE UP (ref 0–1.5)
LYMPHOCYTES # BLD AUTO: 0.92 K/UL — LOW (ref 1–3.3)
LYMPHOCYTES # BLD AUTO: 13 % — SIGNIFICANT CHANGE UP (ref 13–44)
MAGNESIUM SERPL-MCNC: 2.2 MG/DL — SIGNIFICANT CHANGE UP (ref 1.6–2.6)
MCHC RBC-ENTMCNC: 26.7 PG — LOW (ref 27–34)
MCHC RBC-ENTMCNC: 33 % — SIGNIFICANT CHANGE UP (ref 32–36)
MCV RBC AUTO: 80.9 FL — SIGNIFICANT CHANGE UP (ref 80–100)
MONOCYTES # BLD AUTO: 0.62 K/UL — SIGNIFICANT CHANGE UP (ref 0–0.9)
MONOCYTES NFR BLD AUTO: 8.7 % — SIGNIFICANT CHANGE UP (ref 2–14)
NEUTROPHILS # BLD AUTO: 5.09 K/UL — SIGNIFICANT CHANGE UP (ref 1.8–7.4)
NEUTROPHILS NFR BLD AUTO: 71.6 % — SIGNIFICANT CHANGE UP (ref 43–77)
NRBC # FLD: 0 K/UL — SIGNIFICANT CHANGE UP (ref 0–0)
PHOSPHATE SERPL-MCNC: 2.9 MG/DL — SIGNIFICANT CHANGE UP (ref 2.5–4.5)
PLATELET # BLD AUTO: 305 K/UL — SIGNIFICANT CHANGE UP (ref 150–400)
PMV BLD: 10.1 FL — SIGNIFICANT CHANGE UP (ref 7–13)
POTASSIUM SERPL-MCNC: 4.6 MMOL/L — SIGNIFICANT CHANGE UP (ref 3.5–5.3)
POTASSIUM SERPL-SCNC: 4.6 MMOL/L — SIGNIFICANT CHANGE UP (ref 3.5–5.3)
PROT SERPL-MCNC: 6.9 G/DL — SIGNIFICANT CHANGE UP (ref 6–8.3)
RBC # BLD: 5.28 M/UL — SIGNIFICANT CHANGE UP (ref 4.2–5.8)
RBC # FLD: 13.1 % — SIGNIFICANT CHANGE UP (ref 10.3–14.5)
SODIUM SERPL-SCNC: 136 MMOL/L — SIGNIFICANT CHANGE UP (ref 135–145)
WBC # BLD: 7.1 K/UL — SIGNIFICANT CHANGE UP (ref 3.8–10.5)
WBC # FLD AUTO: 7.1 K/UL — SIGNIFICANT CHANGE UP (ref 3.8–10.5)

## 2019-07-05 RX ORDER — LACTOBACILLUS ACIDOPHILUS 100MM CELL
1 CAPSULE ORAL
Qty: 0 | Refills: 0 | DISCHARGE
Start: 2019-07-05

## 2019-07-05 RX ADMIN — Medication 1 TABLET(S): at 17:13

## 2019-07-05 RX ADMIN — Medication 100 MILLIGRAM(S): at 05:22

## 2019-07-05 RX ADMIN — Medication 100 MILLIGRAM(S): at 15:31

## 2019-07-05 RX ADMIN — LOSARTAN POTASSIUM 50 MILLIGRAM(S): 100 TABLET, FILM COATED ORAL at 05:22

## 2019-07-05 RX ADMIN — Medication 81 MILLIGRAM(S): at 11:12

## 2019-07-05 RX ADMIN — HEPARIN SODIUM 5000 UNIT(S): 5000 INJECTION INTRAVENOUS; SUBCUTANEOUS at 05:21

## 2019-07-05 RX ADMIN — Medication 12.5 MILLIGRAM(S): at 05:22

## 2019-07-05 RX ADMIN — HEPARIN SODIUM 5000 UNIT(S): 5000 INJECTION INTRAVENOUS; SUBCUTANEOUS at 21:32

## 2019-07-05 RX ADMIN — HEPARIN SODIUM 5000 UNIT(S): 5000 INJECTION INTRAVENOUS; SUBCUTANEOUS at 15:31

## 2019-07-05 RX ADMIN — PREGABALIN 1000 MICROGRAM(S): 225 CAPSULE ORAL at 11:12

## 2019-07-05 RX ADMIN — Medication 1 TABLET(S): at 08:44

## 2019-07-05 RX ADMIN — Medication 100 MILLIGRAM(S): at 21:33

## 2019-07-05 NOTE — DISCHARGE NOTE PROVIDER - HOSPITAL COURSE
64 yo M PMHx carotid artery disease, HTN, migraines, presenting with non improvement of RLE cellulitis         Cellulitis of right lower extremity    - s/p 3 days of keflex with reported non improvement     - Xray RLE-  No acute bone or joint disease    - c/w Clindamycin 900mg IV Q8hrs, Bacid    - can transition to PO with clinical improvement        Hypertension    - c/w HCTZ 12.5mg daily, Losartan 50mg daily        Hyperlipidemia    - c/w Rosuvastatin 10mg daily        LFTs abnormal    - Patient with elevated Alk Phos, AST/ALT unclear etiology    - will Trend CMP, Abdominal US- Normal        Prophylactic measure    - DVT: SQ[facility] 64 yo M PMHx carotid artery disease, HTN, migraines, presenting with non improvement of RLE cellulitis         Cellulitis of right lower extremity    - s/p 3 days of keflex with reported non improvement     - Xray RLE-  No acute bone or joint disease    - c/w Clindamycin 900mg IV Q8hrs, Bacid    - can transition to PO with clinical improvement        Hypertension    - c/w HCTZ 12.5mg daily, Losartan 50mg daily        Hyperlipidemia    - c/w Rosuvastatin 10mg daily        LFTs abnormal    - Patient with elevated Alk Phos, AST/ALT unclear etiology    - will Trend CMP, Abdominal US- Normal        Prophylactic measure    - DVT: [facility]                Dispo: Home

## 2019-07-05 NOTE — DISCHARGE NOTE PROVIDER - NSDCCPCAREPLAN_GEN_ALL_CORE_FT
PRINCIPAL DISCHARGE DIAGNOSIS  Diagnosis: Cellulitis  Assessment and Plan of Treatment: - Xray RLE-  No acute bone or joint disease  - treated with Clindamycin 900mg IV Q8hrs in the hospital, Bacid  - continue taking Clinda at home for 5 more days  - Follow up with Dr Bliss as outpatient for further management      SECONDARY DISCHARGE DIAGNOSES  Diagnosis: Hyperlipidemia, unspecified hyperlipidemia type  Assessment and Plan of Treatment: - continue taking Rosuvastatin 10mg daily and follow up with PCP to check LFTs within a week    Diagnosis: Hypertension, unspecified type  Assessment and Plan of Treatment: - continue taking HCTZ 12.5mg daily, Losartan 50mg daily    Diagnosis: LFTs abnormal  Assessment and Plan of Treatment: - Patient with elevated Alk Phos, AST/ALT unclear etiology  -, Abdominal US- Normal  - Follow up with PCP as outpatient for further labs and check up PRINCIPAL DISCHARGE DIAGNOSIS  Diagnosis: Cellulitis  Assessment and Plan of Treatment: - Xray RLE-  No acute bone or joint disease  - treated with Clindamycin 900mg IV Q8hrs in the hospital, Bacid  - continue taking Clinda at home for 5 more days  - Follow up with Dr Bliss as outpatient for further management      SECONDARY DISCHARGE DIAGNOSES  Diagnosis: Hypertension, unspecified type  Assessment and Plan of Treatment: - continue taking HCTZ 12.5mg daily, Losartan 50mg daily    Diagnosis: Hyperlipidemia, unspecified hyperlipidemia type  Assessment and Plan of Treatment: - continue taking Rosuvastatin 10mg daily and follow up with PCP to check LFTs within a week    Diagnosis: LFTs abnormal  Assessment and Plan of Treatment: - Patient with elevated Alk Phos, AST/ALT unclear etiology  -, Abdominal US- Normal  - Follow up with PCP as outpatient for further labs and check up

## 2019-07-05 NOTE — DISCHARGE NOTE PROVIDER - CARE PROVIDER_API CALL
Bill Bliss (MD)  Gastroenterology; Internal Medicine  [address], [address]  [place] [address]  Phone: [phone]  Fax: [phone]  Follow Up Time:

## 2019-07-06 ENCOUNTER — TRANSCRIPTION ENCOUNTER (OUTPATIENT)
Age: 64
End: 2019-07-06

## 2019-07-06 VITALS
HEART RATE: 71 BPM | TEMPERATURE: 98 F | DIASTOLIC BLOOD PRESSURE: 64 MMHG | OXYGEN SATURATION: 100 % | RESPIRATION RATE: 18 BRPM | SYSTOLIC BLOOD PRESSURE: 104 MMHG

## 2019-07-06 RX ADMIN — Medication 81 MILLIGRAM(S): at 11:35

## 2019-07-06 RX ADMIN — Medication 100 MILLIGRAM(S): at 05:08

## 2019-07-06 RX ADMIN — PREGABALIN 1000 MICROGRAM(S): 225 CAPSULE ORAL at 11:35

## 2019-07-06 RX ADMIN — Medication 100 MILLIGRAM(S): at 14:22

## 2019-07-06 RX ADMIN — HEPARIN SODIUM 5000 UNIT(S): 5000 INJECTION INTRAVENOUS; SUBCUTANEOUS at 05:07

## 2019-07-06 RX ADMIN — Medication 1 TABLET(S): at 09:11

## 2019-07-06 RX ADMIN — Medication 12.5 MILLIGRAM(S): at 05:07

## 2019-07-06 RX ADMIN — LOSARTAN POTASSIUM 50 MILLIGRAM(S): 100 TABLET, FILM COATED ORAL at 05:07

## 2019-07-06 RX ADMIN — HEPARIN SODIUM 5000 UNIT(S): 5000 INJECTION INTRAVENOUS; SUBCUTANEOUS at 14:22

## 2019-07-06 NOTE — PROGRESS NOTE ADULT - PROBLEM SELECTOR PLAN 1
warm compresses elevate cultures iv clindamycin
warm compresses elevate cultures iv clindamycin
warm compresses elevate cultures iv clindamycin  day 2
warm compresses elevate cultures iv clindamycin  day 4
warm compresses elevate cultures iv clindamycin  day 5
warm compresses elevate cultures iv clindamycin  day 2

## 2019-07-06 NOTE — PROGRESS NOTE ADULT - PROBLEM SELECTOR PLAN 4
continue statins monitor
continue statins monitro

## 2019-07-06 NOTE — PROGRESS NOTE ADULT - PROBLEM SELECTOR PLAN 2
sono liver hep abc profile spep augusto   further work up as outpt consider holding statins  denies etoh use
sono liver normal  hep abc profile spep augusto   further work up as outpt consider holding statins  denies etoh use

## 2019-07-06 NOTE — PROGRESS NOTE ADULT - ASSESSMENT
pt with hx of high bp with right foot cellullitis and high lfts  has had one episode of cellulitis in past need hospitilization for iv antibiotics  clindamycin  wound care
pt with hx of high bp with right foot cellullitis and high lfts  has had one episode of cellulitis in past need hospitilization for iv antibiotics
pt with hx of high bp with right foot cellullitis and high lfts  has had one episode of cellulitis in past need hospitilization for iv antibiotics  clindamycin    day 2 wound care
pt with hx of high bp with right foot cellullitis and high lfts  has had one episode of cellulitis in past need hospitilization for iv antibiotics  clindamycin    day 4 wound care    foot much improved  swelling redness tenderness almost totally gone
pt with hx of high bp with right foot cellullitis and high lfts  has had one episode of cellulitis in past need hospitilization for iv antibiotics  clindamycin    day5 wound care    foot much improved  swelling redness tenderness almost totally gone  discharge today
pt with hx of high bp with right foot cellullitis and high lfts  has had one episode of cellulitis in past need hospitilization for iv antibiotics  clindamycin    day 3 wound care    foot much improved

## 2019-07-06 NOTE — PROGRESS NOTE ADULT - REASON FOR ADMISSION
RLE cellulitis high lfts
RLE cellulitis hypertension
RLE cellulitis  high lfts
RLE cellulitis high lfts
RLE cellulitis hypertension
RLE cellulitis hypertension

## 2019-07-06 NOTE — PROGRESS NOTE ADULT - PROBLEM SELECTOR PROBLEM 1
Cellulitis of right lower extremity

## 2019-07-06 NOTE — DISCHARGE NOTE NURSING/CASE MANAGEMENT/SOCIAL WORK - NSDCDPATPORTLINK_GEN_ALL_CORE
You can access the [email or web address], offered by [facility], by registering with the following website: [email or web address]

## 2019-07-06 NOTE — PROGRESS NOTE ADULT - PROBLEM SELECTOR PLAN 3
monitor bp control

## 2019-07-06 NOTE — PROGRESS NOTE ADULT - SUBJECTIVE AND OBJECTIVE BOX
Subjective: Patient is a 63y old hypertensive Male who presents with a chief complaint of RLE cellulitis  on admission also with hightranasminases recently seen in [facility] with cellulitis on keflex for 3 days no rresponse deines fever chills trauma hx gout  admitted for iv antibiotics     PAST MEDICAL & SURGICAL HISTORY:  HLD (hyperlipidemia)  Hypertension  No significant past surgical history      Allergies    No Known Allergies    Intolerances        MEDICATIONS  (STANDING):  aspirin enteric coated 81 milliGRAM(s) Oral daily  atorvastatin 40 milliGRAM(s) Oral at bedtime  clindamycin IVPB 900 milliGRAM(s) IV Intermittent every 8 hours  cyanocobalamin 1000 MICROGram(s) Oral daily  heparin  Injectable 5000 Unit(s) SubCutaneous every 8 hours  hydrochlorothiazide 12.5 milliGRAM(s) Oral daily  losartan 50 milliGRAM(s) Oral daily    MEDICATIONS  (PRN):      CONSTITUTIONAL: No fever, weight loss, or fatigue  EYES: No eye pain, visual disturbances, or discharge  ENMT:  No difficulty hearing, tinnitus, vertigo; No sinus or throat pain  NECK: No pain or stiffness  BREASTS: No pain, masses, or nipple discharge  RESPIRATORY: No cough, wheezing, chills or hemoptysis; No shortness of breath  CARDIOVASCULAR: No chest pain, palpitations, dizziness, or leg swelling  GASTROINTESTINAL: No abdominal or epigastric pain. No nausea, vomiting, or hematemesis; No diarrhea or constipation. No melena or hematochezia.  GENITOURINARY: No dysuria, frequency, hematuria, or incontinence  NEUROLOGICAL: No headaches, memory loss, loss of strength, numbness, or tremors  SKIN: No itching, burning, rashes, or lesions   LYMPH NODES: No enlarged glands  ENDOCRINE: No heat or cold intolerance; No hair loss  MUSCULOSKELETAL:[id number] right foot PSYCHIATRIC: No depression, anxiety, mood swings, or difficulty sleeping  HEME/LYMPH: No easy bruising, or bleeding gums  ALLERY AND IMMUNOLOGIC: No hives or eczema      PHYSICAL EXAM:    GENERAL: Comfortable, no acute distress   HEAD:  Normocephalic, atraumatic  EYES: EOMI, PERRLA  HEENT: Moist mucous membranes  NECK: Supple, No JVD  NERVOUS SYSTEM:  Alert & Oriented X3, Motor Strength 5/5 B/L upper and lower extremities  CHEST/LUNG: Clear to auscultation bilaterally  HEART: Regular rate and rhythm  ABDOMEN: Soft, Nontender, Nondistended, Bowel sounds present  GENITOURINARY: Voiding, no palpable bladder  EXTREMITIES:   swollen  red tender right foot   MUSCULOSKELTAL- No muscle tenderness, no joint tenderness  SKIN-no rash            Vital Signs Last 24 Hrs  T(C): 36.5 (01 Jul 2019 17:00), Max: 36.9 (01 Jul 2019 09:41)  T(F): 97.7 (01 Jul 2019 17:00), Max: 98.4 (01 Jul 2019 09:41)  HR: 73 (01 Jul 2019 17:00) (73 - 86)  BP: 123/82 (01 Jul 2019 17:00) (122/87 - 141/88)  BP(mean): --  RR: 18 (01 Jul 2019 17:00) (16 - 18)  SpO2: 99% (01 Jul 2019 17:00) (99% - 100%)      LABS:    07-01    138  |  99  |  12  ----------------------------<  102<H>  4.1   |  29  |  1.08    Ca    10.0      01 Jul 2019 10:40    TPro  7.2  /  Alb  4.3  /  TBili  0.4  /  DBili  x   /  AST  97<H>  /  ALT  152<H>  /  AlkPhos  192<H>  07-01    CBC Full  -  ( 01 Jul 2019 10:40 )  WBC Count : 8.77 K/uL  RBC Count : 4.99 M/uL  Hemoglobin : 13.4 g/dL  Hematocrit : 40.3 %  Platelet Count - Automated : 246 K/uL  Mean Cell Volume : 80.8 fL  Mean Cell Hemoglobin : 26.9 pg  Mean Cell Hemoglobin Concentration : 33.3 %  Auto Neutrophil # : 6.89 K/uL  Auto Lymphocyte # : 0.77 K/uL  Auto Monocyte # : 0.90 K/uL  Auto Eosinophil # : 0.14 K/uL  Auto Basophil # : 0.03 K/uL  Auto Neutrophil % : 78.5 %  Auto Lymphocyte % : 8.8 %  Auto Monocyte % : 10.3 %  Auto Eosinophil % : 1.6 %  Auto Basophil % : 0.3 %      LIVER FUNCTIONS - ( 01 Jul 2019 10:40 )  Alb: 4.3 g/dL / Pro: 7.2 g/dL / ALK PHOS: 192 u/L / ALT: 152 u/L / AST: 97 u/L / GGT: x                 EKG:    Imaging Studies:< from: Xray Foot AP + Lateral + Oblique, Right (07.01.19 @ 12:08) >  EXAM:  RAD FOOT MIN 3 VIEWS RIGHT        PROCEDURE DATE:  Jul 1 2019         INTERPRETATION:  TIME OF EXAM: July 1, 2019 at 12:54 PM    CLINICAL INFORMATION: Right foot cellulitis    TECHNIQUE:   AP, oblique and lateral radiographs of the right foot.    INTERPRETATION:     There is no acute fracture or dislocation. No subcutaneous air or bone   destruction to indicate osteomyelitis. Soft tissues are intact.      COMPARISON:  None available      IMPRESSION:  No acute bon    >      Impression / Plan:
Subjective: Patient is a 63y old hypertensive Male who presents with a chief complaint of RLE cellulitis  on admission also with hightranasminases recently seen in [facility] with cellulitis on keflex for 3 days no rresponse deines fever chills trauma hx gout     PAST MEDICAL & SURGICAL HISTORY:  HLD (hyperlipidemia)  Hypertension  No significant past surgical history      Allergies    No Known Allergies    Intolerances        MEDICATIONS  (STANDING):  aspirin enteric coated 81 milliGRAM(s) Oral daily  atorvastatin 40 milliGRAM(s) Oral at bedtime  clindamycin IVPB 900 milliGRAM(s) IV Intermittent every 8 hours  cyanocobalamin 1000 MICROGram(s) Oral daily  heparin  Injectable 5000 Unit(s) SubCutaneous every 8 hours  hydrochlorothiazide 12.5 milliGRAM(s) Oral daily  losartan 50 milliGRAM(s) Oral daily    MEDICATIONS  (PRN):      CONSTITUTIONAL: No fever, weight loss, or fatigue  EYES: No eye pain, visual disturbances, or discharge  ENMT:  No difficulty hearing, tinnitus, vertigo; No sinus or throat pain  NECK: No pain or stiffness  BREASTS: No pain, masses, or nipple discharge  RESPIRATORY: No cough, wheezing, chills or hemoptysis; No shortness of breath  CARDIOVASCULAR: No chest pain, palpitations, dizziness, or leg swelling  GASTROINTESTINAL: No abdominal or epigastric pain. No nausea, vomiting, or hematemesis; No diarrhea or constipation. No melena or hematochezia.  GENITOURINARY: No dysuria, frequency, hematuria, or incontinence  NEUROLOGICAL: No headaches, memory loss, loss of strength, numbness, or tremors  SKIN: No itching, burning, rashes, or lesions   LYMPH NODES: No enlarged glands  ENDOCRINE: No heat or cold intolerance; No hair loss  MUSCULOSKELETAL:[id number] right foot PSYCHIATRIC: No depression, anxiety, mood swings, or difficulty sleeping  HEME/LYMPH: No easy bruising, or bleeding gums  ALLERY AND IMMUNOLOGIC: No hives or eczema      PHYSICAL EXAM:    GENERAL: Comfortable, no acute distress   HEAD:  Normocephalic, atraumatic  EYES: EOMI, PERRLA  HEENT: Moist mucous membranes  NECK: Supple, No JVD  NERVOUS SYSTEM:  Alert & Oriented X3, Motor Strength 5/5 B/L upper and lower extremities  CHEST/LUNG: Clear to auscultation bilaterally  HEART: Regular rate and rhythm  ABDOMEN: Soft, Nontender, Nondistended, Bowel sounds present  GENITOURINARY: Voiding, no palpable bladder  EXTREMITIES:   swollen  red tender right foot   MUSCULOSKELTAL- No muscle tenderness, no joint tenderness  SKIN-no rash            Vital Signs Last 24 Hrs  T(C): 36.5 (01 Jul 2019 17:00), Max: 36.9 (01 Jul 2019 09:41)  T(F): 97.7 (01 Jul 2019 17:00), Max: 98.4 (01 Jul 2019 09:41)  HR: 73 (01 Jul 2019 17:00) (73 - 86)  BP: 123/82 (01 Jul 2019 17:00) (122/87 - 141/88)  BP(mean): --  RR: 18 (01 Jul 2019 17:00) (16 - 18)  SpO2: 99% (01 Jul 2019 17:00) (99% - 100%)      LABS:    07-01    138  |  99  |  12  ----------------------------<  102<H>  4.1   |  29  |  1.08    Ca    10.0      01 Jul 2019 10:40    TPro  7.2  /  Alb  4.3  /  TBili  0.4  /  DBili  x   /  AST  97<H>  /  ALT  152<H>  /  AlkPhos  192<H>  07-01    CBC Full  -  ( 01 Jul 2019 10:40 )  WBC Count : 8.77 K/uL  RBC Count : 4.99 M/uL  Hemoglobin : 13.4 g/dL  Hematocrit : 40.3 %  Platelet Count - Automated : 246 K/uL  Mean Cell Volume : 80.8 fL  Mean Cell Hemoglobin : 26.9 pg  Mean Cell Hemoglobin Concentration : 33.3 %  Auto Neutrophil # : 6.89 K/uL  Auto Lymphocyte # : 0.77 K/uL  Auto Monocyte # : 0.90 K/uL  Auto Eosinophil # : 0.14 K/uL  Auto Basophil # : 0.03 K/uL  Auto Neutrophil % : 78.5 %  Auto Lymphocyte % : 8.8 %  Auto Monocyte % : 10.3 %  Auto Eosinophil % : 1.6 %  Auto Basophil % : 0.3 %      LIVER FUNCTIONS - ( 01 Jul 2019 10:40 )  Alb: 4.3 g/dL / Pro: 7.2 g/dL / ALK PHOS: 192 u/L / ALT: 152 u/L / AST: 97 u/L / GGT: x                 EKG:    Imaging Studies:< from: Xray Foot AP + Lateral + Oblique, Right (07.01.19 @ 12:08) >  EXAM:  RAD FOOT MIN 3 VIEWS RIGHT        PROCEDURE DATE:  Jul 1 2019         INTERPRETATION:  TIME OF EXAM: July 1, 2019 at 12:54 PM    CLINICAL INFORMATION: Right foot cellulitis    TECHNIQUE:   AP, oblique and lateral radiographs of the right foot.    INTERPRETATION:     There is no acute fracture or dislocation. No subcutaneous air or bone   destruction to indicate osteomyelitis. Soft tissues are intact.      COMPARISON:  None available      IMPRESSION:  No acute bon    >      Impression / Plan:
Subjective: Patient is a 63y old hypertensive Male who presents with a chief complaint of RLE cellulitis  on admission also with hightranasminases recently seen in [facility] with cellulitis on keflex for 3 days no rresponse deines fever chills trauma hx gout  now on iv clindamycin day 2  admitted for iv antibiotics     PAST MEDICAL & SURGICAL HISTORY:  HLD (hyperlipidemia)  Hypertension  No significant past surgical history      Allergies    No Known Allergies    Intolerances        MEDICATIONS  (STANDING):  aspirin enteric coated 81 milliGRAM(s) Oral daily  atorvastatin 40 milliGRAM(s) Oral at bedtime  clindamycin IVPB 900 milliGRAM(s) IV Intermittent every 8 hours  cyanocobalamin 1000 MICROGram(s) Oral daily  heparin  Injectable 5000 Unit(s) SubCutaneous every 8 hours  hydrochlorothiazide 12.5 milliGRAM(s) Oral daily  losartan 50 milliGRAM(s) Oral daily    MEDICATIONS  (PRN):      CONSTITUTIONAL: No fever, weight loss, or fatigue  EYES: No eye pain, visual disturbances, or discharge  ENMT:  No difficulty hearing, tinnitus, vertigo; No sinus or throat pain  NECK: No pain or stiffness  BREASTS: No pain, masses, or nipple discharge  RESPIRATORY: No cough, wheezing, chills or hemoptysis; No shortness of breath  CARDIOVASCULAR: No chest pain, palpitations, dizziness, or leg swelling  GASTROINTESTINAL: No abdominal or epigastric pain. No nausea, vomiting, or hematemesis; No diarrhea or constipation. No melena or hematochezia.  GENITOURINARY: No dysuria, frequency, hematuria, or incontinence  NEUROLOGICAL: No headaches, memory loss, loss of strength, numbness, or tremors  SKIN: No itching, burning, rashes, or lesions   LYMPH NODES: No enlarged glands  ENDOCRINE: No heat or cold intolerance; No hair loss  MUSCULOSKELETAL:[id number] right foot PSYCHIATRIC: No depression, anxiety, mood swings, or difficulty sleeping  HEME/LYMPH: No easy bruising, or bleeding gums  ALLERY AND IMMUNOLOGIC: No hives or eczema      PHYSICAL EXAM: afebrile 132/88    GENERAL: Comfortable, no acute distress   HEAD:  Normocephalic, atraumatic  EYES: EOMI, PERRLA  HEENT: Moist mucous membranes  NECK: Supple, No JVD  NERVOUS SYSTEM:  Alert & Oriented X3, Motor Strength 5/5 B/L upper and lower extremities  CHEST/LUNG: Clear to auscultation bilaterally  HEART: Regular rate and rhythm  ABDOMEN: Soft, Nontender, Nondistended, Bowel sounds present  GENITOURINARY: Voiding, no palpable bladder  EXTREMITIES:   swollen  red tender right foot  slightly improved   MUSCULOSKELTAL- No muscle tenderness, no joint tenderness  SKIN-no rash    Complete Blood Count + Automated Diff in AM (07.02.19 @ 05:50)    Nucleated RBC #: 0 K/uL    WBC Count: 7.03 K/uL    RBC Count: 4.82 M/uL    Hemoglobin: 13.0 g/dL    Hematocrit: 39.1 %    Mean Cell Volume: 81.1 fL    Mean Cell Hemoglobin: 27.0 pg    Mean Cell Hemoglobin Conc: 33.2 %    Red Cell Distrib Width: 12.9 %    Platelet Count - Automated: 227 K/uL    MPV: 10.1 fl    Auto Neutrophil #: 5.21 K/uL    Auto Lymphocyte #: 0.74 K/uL    Auto Monocyte #: 0.83 K/uL    Auto Eosinophil #: 0.20 K/uL    Auto Basophil #: 0.02 K/uL    Auto Neutrophil %: 74.2 %    Auto Lymphocyte %: 10.5 %    Auto Monocyte %: 11.8 %    Auto Eosinophil %: 2.8 %    Auto Basophil %: 0.3 %    Auto Immature Granulocyte %: 0.4: (Includes meta, myelo and promyelocytes) %  Comprehensive Metabolic, Mg + Phosphorus (07.02.19 @ 05:50)    Phosphorus Level, Serum: 2.7 mg/dL    eGFR if : 95 mL/min    eGFR if Non : 82: The units for eGFR are ml/min/1.73m2 (normalized body  surface area). The eGFR is calculated from a serum  creatinine using the CKD-EPI equation. Other variables  required for calculation are race, age and sex. Among  patients with chronic kidney disease (CKD), the eGFR is  useful in determining the stage of disease according to  KDOQI CKD classification. All eGFR results are reported  numerically with the following interpretation.    GFR  (ml/min/1.73 m2)          W/KIDNEY DAMAGE    W/O KIDNEY DMG  ==========================================================  >= 90.......................Stage 1..............Normal  60-89.......................Stage 2...........Decreased GFR  30-59.......................Stage 3..............Stage 3  15-29.......................Stage 4..............Stage 4  < 15........................Stage 5..............Stage 5    Each stage of CKD assumes that the associated GFR level  has been in effect for at least 3 months. Determination of  stages one and two (with eGFR > 59ml/min/m2) requires  estimation of kidney damage for at least 3 months as  defined by structural or functional abnormalities.    Limitations: All estimates of GFR will be less accurate  for patients at extremes of muscle mass (including but  not limited to frail elderly, critically ill, or cancer  patients), those with unusual diets, and those with  conditions associated with reduced secretion or  extrarenal elimination of creatinine. The eGFR equation  is not recommended for use in patients with unstable  creatinine levels. mL/min    Sodium, Serum: 136 mmol/L    Potassium, Serum: 4.9 mmol/L    Chloride, Serum: 99 mmol/L    Carbon Dioxide, Serum: 26 mmol/L    Anion Gap, Serum: 11 mmo/L    Blood Urea Nitrogen, Serum: 11 mg/dL    Creatinine, Serum: 0.97 mg/dL    Glucose, Serum: 101 mg/dL    Calcium, Total Serum: 9.9 mg/dL    Protein Total, Serum: 6.5 g/dL    Albumin, Serum: 3.7 g/dL    Bilirubin Total, Serum: 0.7: Delta: 0.4 on 07/01/  Delta: 0.4 on 07/01/ mg/dL    Alkaline Phosphatase, Serum: 179 u/L    Aspartate Aminotransferase (AST/SGOT): 46 u/L    Alanine Aminotransferase (ALT/SGPT): 104 u/L    Magnesium, Serum: 2.0 mg/dL                  LIVER FUNCTIONS - ( 01 Jul 2019 10:40 )  Alb: 4.3 g/dL / Pro: 7.2 g/dL / ALK PHOS: 192 u/L / ALT: 152 u/L / AST: 97 u/L / GGT: x           hep c negative       EKG:    Imaging Studies:< from: Xray Foot AP + Lateral + Oblique, Right (07.01.19 @ 12:08) >  EXAM:  RAD FOOT MIN 3 VIEWS RIGHT        PROCEDURE DATE:  Jul 1 2019         INTERPRETATION:  TIME OF EXAM: July 1, 2019 at 12:54 PM    CLINICAL INFORMATION: Right foot cellulitis    TECHNIQUE:   AP, oblique and lateral radiographs of the right foot.    INTERPRETATION:     There is no acute fracture or dislocation. No subcutaneous air or bone   destruction to indicate osteomyelitis. Soft tissues are intact.      COMPARISON:  None available      IMPRESSION:  No acute bone infection      >      Impression / Plan:
Subjective: Patient is a 63y old hypertensive Male who presents with a chief complaint of RLE cellulitis  on admission also with hightranasminases recently seen in [facility] with cellulitis on keflex for 3 days no rresponse deines fever chills trauma hx gout  now on iv clindamycin day 4  admitted for iv antibiotics  cellulitis   PAST MEDICAL & SURGICAL HISTORY:  HLD (hyperlipidemia)  Hypertension  No significant past surgical history      Allergies    No Known Allergies    Intolerances        MEDICATIONS  (STANDING):  aspirin enteric coated 81 milliGRAM(s) Oral daily  atorvastatin 40 milliGRAM(s) Oral at bedtime  clindamycin IVPB 900 milliGRAM(s) IV Intermittent every 8 hours  cyanocobalamin 1000 MICROGram(s) Oral daily  heparin  Injectable 5000 Unit(s) SubCutaneous every 8 hours  hydrochlorothiazide 12.5 milliGRAM(s) Oral daily  losartan 50 milliGRAM(s) Oral daily    MEDICATIONS  (PRN):      CONSTITUTIONAL: No fever, weight loss, or fatigue  EYES: No eye pain, visual disturbances, or discharge  ENMT:  No difficulty hearing, tinnitus, vertigo; No sinus or throat pain  NECK: No pain or stiffness  BREASTS: No pain, masses, or nipple discharge  RESPIRATORY: No cough, wheezing, chills or hemoptysis; No shortness of breath  CARDIOVASCULAR: No chest pain, palpitations, dizziness, or leg swelling  GASTROINTESTINAL: No abdominal or epigastric pain. No nausea, vomiting, or hematemesis; No diarrhea or constipation. No melena or hematochezia.  GENITOURINARY: No dysuria, frequency, hematuria, or incontinence  NEUROLOGICAL: No headaches, memory loss, loss of strength, numbness, or tremors  SKIN: No itching, burning, rashes, or lesions   LYMPH NODES: No enlarged glands  ENDOCRINE: No heat or cold intolerance; No hair loss  MUSCULOSKELETAL:[id number] right foot PSYCHIATRIC: No depression, anxiety, mood swings, or difficulty sleeping  HEME/LYMPH: No easy bruising, or bleeding gums  ALLERY AND IMMUNOLOGIC: No hives or eczema      PHYSICAL EXAM: afebrile 120/78  GENERAL: Comfortable, no acute distress   HEAD:  Normocephalic, atraumatic  EYES: EOMI, PERRLA  HEENT: Moist mucous membranes  NECK: Supple, No JVD  NERVOUS SYSTEM:  Alert & Oriented X3, Motor Strength 5/5 B/L upper and lower extremities  CHEST/LUNG: Clear to auscultation bilaterally  HEART: Regular rate and rhythm  ABDOMEN: Soft, Nontender, Nondistended, Bowel sounds present  GENITOURINARY: Voiding, no palpable bladder  EXTREMITIES:   swollen  red tender right foot   much improved  MUSCULOSKELTAL- No muscle tenderness, no joint tenderness  SKIN-no rash  Acute Hepatitis Panel (07.03.19 @ 05:45)    Hepatitis C Virus Interpretation: Nonreactive Hepatitis C AB  S/CO Ratio                        Interpretation  < 1.00                                   Non-Reactive  1.00 - 4.99                         Weakly-Reactive  >= 5.00                                Reactive  Non-Reactive: [place] with a non-reactive HCV antibody  result is considered uninfected.  No further action is  needed unless recent infection is suspected.  In these  cases, consider repeat testing later to detect  seroconversion..  Weakly-Reactive: HCV antibody test is abnormal, HCV RNA  Qualitative test will follow.  Reactive: HCV antibody test is abnormal, HCV RNA  Qualitative test will follow.  Note: HCV antibody testing is performed on the [employer]   system.    Hepatitis C Virus S/CO Ratio: 0.08 S/CO    Hepatitis B Core IgM Antibody: Nonreactive    Hepatitis B Surface Antigen: Nonreactive    Hepatitis A IgM Antibody: Nonreactive  Hepatic Function Panel in AM (07.03.19 @ 05:45)    Protein Total, Serum: 6.7 g/dL    Albumin, Serum: 3.8 g/dL    Bilirubin Total, Serum: 0.4 mg/dL    Bilirubin Direct, Serum: < 0.2 mg/dL    Alkaline Phosphatase, Serum: 185 u/L    Aspartate Aminotransferase (AST/SGOT): 40 u/L    Alanine Aminotransferase (ALT/SGPT): 88 u/L    Complete Blood Count + Automated Diff in AM (07.02.19 @ 05:50)    Nucleated RBC #: 0 K/uL    WBC Count: 7.03 K/uL    RBC Count: 4.82 M/uL    Hemoglobin: 13.0 g/dL    Hematocrit: 39.1 %    Mean Cell Volume: 81.1 fL    Mean Cell Hemoglobin: 27.0 pg    Mean Cell Hemoglobin Conc: 33.2 %    Red Cell Distrib Width: 12.9 %    Platelet Count - Automated: 227 K/uL    MPV: 10.1 fl    Auto Neutrophil #: 5.21 K/uL    Auto Lymphocyte #: 0.74 K/uL    Auto Monocyte #: 0.83 K/uL    Auto Eosinophil #: 0.20 K/uL    Auto Basophil #: 0.02 K/uL    Auto Neutrophil %: 74.2 %    Auto Lymphocyte %: 10.5 %    Auto Monocyte %: 11.8 %    Auto Eosinophil %: 2.8 %    Auto Basophil %: 0.3 %    Auto Immature Granulocyte %: 0.4: (Includes meta, myelo and promyelocytes) %          Comprehensive Metabolic, Mg + Phosphorus (07.02.19 @ 05:50)    Phosphorus Level, Serum: 2.7 mg/dL    eGFR if : 95 mL/min    eGFR if Non : 82: The units for eGFR are ml/min/1.73m2 (normalized body  surface area). The eGFR is calculated from a serum  creatinine using the CKD-EPI equation. Other variables  required for calculation are race, age and sex. Among  patients with chronic kidney disease (CKD), the eGFR is  useful in determining the stage of disease according to  KDOQI CKD classification. All eGFR results are reported  numerically with the following interpretation.    GFR  (ml/min/1.73 m2)          W/KIDNEY DAMAGE    W/O KIDNEY DMG  ==========================================================  >= 90.......................Stage 1..............Normal  60-89.......................Stage 2...........Decreased GFR  30-59.......................Stage 3..............Stage 3  15-29.......................Stage 4..............Stage 4  < 15........................Stage 5..............Stage 5    Each stage of CKD assumes that the associated GFR level  has been in effect for at least 3 months. Determination of  stages one and two (with eGFR > 59ml/min/m2) requires  estimation of kidney damage for at least 3 months as  defined by structural or functional abnormalities.    Limitations: All estimates of GFR will be less accurate  for patients at extremes of muscle mass (including but  not limited to frail elderly, critically ill, or cancer  patients), those with unusual diets, and those with  conditions associated with reduced secretion or  extrarenal elimination of creatinine. The eGFR equation  is not recommended for use in patients with unstable  creatinine levels. mL/min    Sodium, Serum: 136 mmol/L    Potassium, Serum: 4.9 mmol/L    Chloride, Serum: 99 mmol/L    Carbon Dioxide, Serum: 26 mmol/L    Anion Gap, Serum: 11 mmo/L    Blood Urea Nitrogen, Serum: 11 mg/dL    Creatinine, Serum: 0.97 mg/dL    Glucose, Serum: 101 mg/dL    Calcium, Total Serum: 9.9 mg/dL    Protein Total, Serum: 6.5 g/dL    Albumin, Serum: 3.7 g/dL    Bilirubin Total, Serum: 0.7: Delta: 0.4 on 07/01/  Delta: 0.4 on 07/01/ mg/dL    Alkaline Phosphatase, Serum: 179 u/L    Aspartate Aminotransferase (AST/SGOT): 46 u/L    Alanine Aminotransferase (ALT/SGPT): 104 u/L    Magnesium, Serum: 2.0 mg/dL                  LIVER FUNCTIONS - ( 01 Jul 2019 10:40 )  Alb: 4.3 g/dL / Pro: 7.2 g/dL / ALK PHOS: 192 u/L / ALT: 152 u/L / AST: 97 u/L / GGT: x           hep c negative       EKG:    Imaging Studies:< from: Xray Foot AP + Lateral + Oblique, Right (07.01.19 @ 12:08) >  EXAM:  RAD FOOT MIN 3 VIEWS RIGHT        PROCEDURE DATE:  Jul 1 2019         INTERPRETATION:  TIME OF EXAM: July 1, 2019 at 12:54 PM    CLINICAL INFORMATION: Right foot cellulitis    TECHNIQUE:   AP, oblique and lateral radiographs of the right foot.    INTERPRETATION:     There is no acute fracture or dislocation. No subcutaneous air or bone   destruction to indicate osteomyelitis. Soft tissues are intact.      COMPARISON:  None available      IMPRESSION:  No acute bone infection      < from: US Abdomen Limited (07.03.19 @ 10:48) >  EXAM:  US ABDOMEN LIMITED        PROCEDURE DATE:  Jul  3 2019         INTERPRETATION:  CLINICAL INFORMATION: 63-year-old man with increased LFTs    COMPARISON: None available.    TECHNIQUE: Sonography of the right upper quadrant.     FINDINGS:    Liver: Within normal limits.    Bile ducts: Normal caliber.     Gallbladder: Within normal limits.        Pancreas: Not visualized.    Right kidney: 11.3 cm. No hydronephrosis.    Ascites: None.    IVC: Visualized portions are within normal limits.    IMPRESSION:     No abnormality seen          Impression / Plan:
Subjective: Patient is a 63y old hypertensive Male who presents with a chief complaint of RLE cellulitis  on admission also with hightranasminases recently seen in [facility] with cellulitis on keflex for 3 days no rresponse deines fever chills trauma hx gout  now on iv clindamycin day 5  admitted for iv antibiotics  cellulitis  to discharge today on po clinda   PAST MEDICAL & SURGICAL HISTORY:  HLD (hyperlipidemia)  Hypertension  No significant past surgical history      Allergies    No Known Allergies    Intolerances        MEDICATIONS  (STANDING):  aspirin enteric coated 81 milliGRAM(s) Oral daily  atorvastatin 40 milliGRAM(s) Oral at bedtime  clindamycin IVPB 900 milliGRAM(s) IV Intermittent every 8 hours  cyanocobalamin 1000 MICROGram(s) Oral daily  heparin  Injectable 5000 Unit(s) SubCutaneous every 8 hours  hydrochlorothiazide 12.5 milliGRAM(s) Oral daily  losartan 50 milliGRAM(s) Oral daily    MEDICATIONS  (PRN):      CONSTITUTIONAL: No fever, weight loss, or fatigue  EYES: No eye pain, visual disturbances, or discharge  ENMT:  No difficulty hearing, tinnitus, vertigo; No sinus or throat pain  NECK: No pain or stiffness  BREASTS: No pain, masses, or nipple discharge  RESPIRATORY: No cough, wheezing, chills or hemoptysis; No shortness of breath  CARDIOVASCULAR: No chest pain, palpitations, dizziness, or leg swelling  GASTROINTESTINAL: No abdominal or epigastric pain. No nausea, vomiting, or hematemesis; No diarrhea or constipation. No melena or hematochezia.  GENITOURINARY: No dysuria, frequency, hematuria, or incontinence  NEUROLOGICAL: No headaches, memory loss, loss of strength, numbness, or tremors  SKIN: No itching, burning, rashes, or lesions   LYMPH NODES: No enlarged glands  ENDOCRINE: No heat or cold intolerance; No hair loss  MUSCULOSKELETAL:[id number] right foot PSYCHIATRIC: No depression, anxiety, mood swings, or difficulty sleeping  HEME/LYMPH: No easy bruising, or bleeding gums  ALLERY AND IMMUNOLOGIC: No hives or eczema      PHYSICAL EXAM: afebrile 120/78  GENERAL: Comfortable, no acute distress   HEAD:  Normocephalic, atraumatic  EYES: EOMI, PERRLA  HEENT: Moist mucous membranes  NECK: Supple, No JVD  NERVOUS SYSTEM:  Alert & Oriented X3, Motor Strength 5/5 B/L upper and lower extremities  CHEST/LUNG: Clear to auscultation bilaterally  HEART: Regular rate and rhythm  ABDOMEN: Soft, Nontender, Nondistended, Bowel sounds present  GENITOURINARY: Voiding, no palpable bladder  EXTREMITIES:   swollen  red tender right foot   resolved  MUSCULOSKELTAL- No muscle tenderness, no joint tenderness  SKIN-no rash  Acute Hepatitis Panel (07.03.19 @ 05:45)    Hepatitis C Virus Interpretation: Nonreactive Hepatitis C AB  S/CO Ratio                        Interpretation  < 1.00                                   Non-Reactive  1.00 - 4.99                         Weakly-Reactive  >= 5.00                                Reactive  Non-Reactive: Aperson with a non-reactive HCV antibody  result is considered uninfected.  No further action is  needed unless recent infection is suspected.  In these  cases, consider repeat testing later to detect  seroconversion..  Weakly-Reactive: HCV antibody test is abnormal, HCV RNA  Qualitative test will follow.  Reactive: HCV antibody test is abnormal, HCV RNA  Qualitative test will follow.  Note: HCV antibody testing is performed on the Abbott   system.    Hepatitis C Virus S/CO Ratio: 0.08 S/CO    Hepatitis B Core IgM Antibody: Nonreactive    Hepatitis B Surface Antigen: Nonreactive    Hepatitis A IgM Antibody: Nonreactive  Hepatic Function Panel in AM (07.03.19 @ 05:45)    Protein Total, Serum: 6.7 g/dL    Albumin, Serum: 3.8 g/dL    Bilirubin Total, Serum: 0.4 mg/dL    Bilirubin Direct, Serum: < 0.2 mg/dL    Alkaline Phosphatase, Serum: 185 u/L    Aspartate Aminotransferase (AST/SGOT): 40 u/L    Alanine Aminotransferase (ALT/SGPT): 88 u/L    Complete Blood Count + Automated Diff in AM (07.02.19 @ 05:50)    Nucleated RBC #: 0 K/uL    WBC Count: 7.03 K/uL    RBC Count: 4.82 M/uL    Hemoglobin: 13.0 g/dL    Hematocrit: 39.1 %    Mean Cell Volume: 81.1 fL    Mean Cell Hemoglobin: 27.0 pg    Mean Cell Hemoglobin Conc: 33.2 %    Red Cell Distrib Width: 12.9 %    Platelet Count - Automated: 227 K/uL    MPV: 10.1 fl    Auto Neutrophil #: 5.21 K/uL    Auto Lymphocyte #: 0.74 K/uL    Auto Monocyte #: 0.83 K/uL    Auto Eosinophil #: 0.20 K/uL    Auto Basophil #: 0.02 K/uL    Auto Neutrophil %: 74.2 %    Auto Lymphocyte %: 10.5 %    Auto Monocyte %: 11.8 %    Auto Eosinophil %: 2.8 %    Auto Basophil %: 0.3 %    Auto Immature Granulocyte %: 0.4: (Includes meta, myelo and promyelocytes) %          Comprehensive Metabolic, Mg + Phosphorus (07.02.19 @ 05:50)    Phosphorus Level, Serum: 2.7 mg/dL    eGFR if : 95 mL/min    eGFR if Non : 82: The units for eGFR are ml/min/1.73m2 (normalized body  surface area). The eGFR is calculated from a serum  creatinine using the CKD-EPI equation. Other variables  required for calculation are race, age and sex. Among  patients with chronic kidney disease (CKD), the eGFR is  useful in determining the stage of disease according to  KDOQI CKD classification. All eGFR results are reported  numerically with the following interpretation.    GFR  (ml/min/1.73 m2)          W/KIDNEY DAMAGE    W/O KIDNEY DMG  ==========================================================  >= 90.......................Stage 1..............Normal  60-89.......................Stage 2...........Decreased GFR  30-59.......................Stage 3..............Stage 3  15-29.......................Stage 4..............Stage 4  < 15........................Stage 5..............Stage 5    Each stage of CKD assumes that the associated GFR level  has been in effect for at least 3 months. Determination of  stages one and two (with eGFR > 59ml/min/m2) requires  estimation of kidney damage for at least 3 months as  defined by structural or functional abnormalities.    Limitations: All estimates of GFR will be less accurate  for patients at extremes of muscle mass (including but  not limited to frail elderly, critically ill, or cancer  patients), those with unusual diets, and those with  conditions associated with reduced secretion or  extrarenal elimination of creatinine. The eGFR equation  is not recommended for use in patients with unstable  creatinine levels. mL/min    Sodium, Serum: 136 mmol/L    Potassium, Serum: 4.9 mmol/L    Chloride, Serum: 99 mmol/L    Carbon Dioxide, Serum: 26 mmol/L    Anion Gap, Serum: 11 mmo/L    Blood Urea Nitrogen, Serum: 11 mg/dL    Creatinine, Serum: 0.97 mg/dL    Glucose, Serum: 101 mg/dL    Calcium, Total Serum: 9.9 mg/dL    Protein Total, Serum: 6.5 g/dL    Albumin, Serum: 3.7 g/dL    Bilirubin Total, Serum: 0.7: Delta: 0.4 on 07/01/  Delta: 0.4 on 07/01/ mg/dL    Alkaline Phosphatase, Serum: 179 u/L    Aspartate Aminotransferase (AST/SGOT): 46 u/L    Alanine Aminotransferase (ALT/SGPT): 104 u/L    Magnesium, Serum: 2.0 mg/dL                  LIVER FUNCTIONS - ( 01 Jul 2019 10:40 )  Alb: 4.3 g/dL / Pro: 7.2 g/dL / ALK PHOS: 192 u/L / ALT: 152 u/L / AST: 97 u/L / GGT: x           hep c negative       EKG:    Imaging Studies:< from: Xray Foot AP + Lateral + Oblique, Right (07.01.19 @ 12:08) >  EXAM:  RAD FOOT MIN 3 VIEWS RIGHT        PROCEDURE DATE:  Jul 1 2019         INTERPRETATION:  TIME OF EXAM: July 1, 2019 at 12:54 PM    CLINICAL INFORMATION: Right foot cellulitis    TECHNIQUE:   AP, oblique and lateral radiographs of the right foot.    INTERPRETATION:     There is no acute fracture or dislocation. No subcutaneous air or bone   destruction to indicate osteomyelitis. Soft tissues are intact.      COMPARISON:  None available      IMPRESSION:  No acute bone infection      < from: US Abdomen Limited (07.03.19 @ 10:48) >  EXAM:  US ABDOMEN LIMITED        PROCEDURE DATE:  Jul  3 2019         INTERPRETATION:  CLINICAL INFORMATION: 63-year-old man with increased LFTs    COMPARISON: None available.    TECHNIQUE: Sonography of the right upper quadrant.     FINDINGS:    Liver: Within normal limits.    Bile ducts: Normal caliber.     Gallbladder: Within normal limits.        Pancreas: Not visualized.    Right kidney: 11.3 cm. No hydronephrosis.    Ascites: None.    IVC: Visualized portions are within normal limits.    IMPRESSION:     No abnormality seen          Impression / Plan:
Subjective: Patient is a 63y old hypertensive Male who presents with a chief complaint of RLE cellulitis  on admission also with hightranasminases recently seen in [facility] office with cellulitis on keflex for 3 days no rresponse deines fever chills trauma hx gout  now on iv clindamycin day 3  admitted for iv antibiotics   wound improving     PAST MEDICAL & SURGICAL HISTORY:  HLD (hyperlipidemia)  Hypertension  No significant past surgical history      Allergies    No Known Allergies    Intolerances        MEDICATIONS  (STANDING):  aspirin enteric coated 81 milliGRAM(s) Oral daily  atorvastatin 40 milliGRAM(s) Oral at bedtime  clindamycin IVPB 900 milliGRAM(s) IV Intermittent every 8 hours  cyanocobalamin 1000 MICROGram(s) Oral daily  heparin  Injectable 5000 Unit(s) SubCutaneous every 8 hours  hydrochlorothiazide 12.5 milliGRAM(s) Oral daily  losartan 50 milliGRAM(s) Oral daily    MEDICATIONS  (PRN):      CONSTITUTIONAL: No fever, weight loss, or fatigue  EYES: No eye pain, visual disturbances, or discharge  ENMT:  No difficulty hearing, tinnitus, vertigo; No sinus or throat pain  NECK: No pain or stiffness  BREASTS: No pain, masses, or nipple discharge  RESPIRATORY: No cough, wheezing, chills or hemoptysis; No shortness of breath  CARDIOVASCULAR: No chest pain, palpitations, dizziness, or leg swelling  GASTROINTESTINAL: No abdominal or epigastric pain. No nausea, vomiting, or hematemesis; No diarrhea or constipation. No melena or hematochezia.  GENITOURINARY: No dysuria, frequency, hematuria, or incontinence  NEUROLOGICAL: No headaches, memory loss, loss of strength, numbness, or tremors  SKIN: No itching, burning, rashes, or lesions   LYMPH NODES: No enlarged glands  ENDOCRINE: No heat or cold intolerance; No hair loss  MUSCULOSKELETAL:[id number] right foot PSYCHIATRIC: No depression, anxiety, mood swings, or difficulty sleeping  HEME/LYMPH: No easy bruising, or bleeding gums  ALLERY AND IMMUNOLOGIC: No hives or eczema      PHYSICAL EXAM: afebrile 132/88    GENERAL: Comfortable, no acute distress   HEAD:  Normocephalic, atraumatic  EYES: EOMI, PERRLA  HEENT: Moist mucous membranes  NECK: Supple, No JVD  NERVOUS SYSTEM:  Alert & Oriented X3, Motor Strength 5/5 B/L upper and lower extremities  CHEST/LUNG: Clear to auscultation bilaterally  HEART: Regular rate and rhythm  ABDOMEN: Soft, Nontender, Nondistended, Bowel sounds present  GENITOURINARY: Voiding, no palpable bladder  EXTREMITIES:   swollen  red tender right foot   much imoproved   MUSCULOSKELTAL- No muscle tenderness, no joint tenderness  SKIN-no rash  Acute Hepatitis Panel (07.03.19 @ 05:45)    Hepatitis C Virus Interpretation: Nonreactive Hepatitis C AB  S/CO Ratio                        Interpretation  < 1.00                                   Non-Reactive  1.00 - 4.99                         Weakly-Reactive  >= 5.00                                Reactive  Non-Reactive: Aperson with a non-reactive HCV antibody  result is considered uninfected.  No further action is  needed unless recent infection is suspected.  In these  cases, consider repeat testing later to detect  seroconversion..  Weakly-Reactive: HCV antibody test is abnormal, HCV RNA  Qualitative test will follow.  Reactive: HCV antibody test is abnormal, HCV RNA  Qualitative test will follow.  Note: HCV antibody testing is performed on the Abbott   system.    Hepatitis C Virus S/CO Ratio: 0.08 S/CO    Hepatitis B Core IgM Antibody: Nonreactive    Hepatitis B Surface Antigen: Nonreactive    Hepatitis A IgM Antibody: Nonreactive  Hepatic Function Panel in AM (07.03.19 @ 05:45)    Protein Total, Serum: 6.7 g/dL    Albumin, Serum: 3.8 g/dL    Bilirubin Total, Serum: 0.4 mg/dL    Bilirubin Direct, Serum: < 0.2 mg/dL    Alkaline Phosphatase, Serum: 185 u/L    Aspartate Aminotransferase (AST/SGOT): 40 u/L    Alanine Aminotransferase (ALT/SGPT): 88 u/L    Complete Blood Count + Automated Diff in AM (07.02.19 @ 05:50)    Nucleated RBC #: 0 K/uL    WBC Count: 7.03 K/uL    RBC Count: 4.82 M/uL    Hemoglobin: 13.0 g/dL    Hematocrit: 39.1 %    Mean Cell Volume: 81.1 fL    Mean Cell Hemoglobin: 27.0 pg    Mean Cell Hemoglobin Conc: 33.2 %    Red Cell Distrib Width: 12.9 %    Platelet Count - Automated: 227 K/uL    MPV: 10.1 fl    Auto Neutrophil #: 5.21 K/uL    Auto Lymphocyte #: 0.74 K/uL    Auto Monocyte #: 0.83 K/uL    Auto Eosinophil #: 0.20 K/uL    Auto Basophil #: 0.02 K/uL    Auto Neutrophil %: 74.2 %    Auto Lymphocyte %: 10.5 %    Auto Monocyte %: 11.8 %    Auto Eosinophil %: 2.8 %    Auto Basophil %: 0.3 %    Auto Immature Granulocyte %: 0.4: (Includes meta, myelo and promyelocytes) %          Comprehensive Metabolic, Mg + Phosphorus (07.02.19 @ 05:50)    Phosphorus Level, Serum: 2.7 mg/dL    eGFR if : 95 mL/min    eGFR if Non : 82: The units for eGFR are ml/min/1.73m2 (normalized body  surface area). The eGFR is calculated from a serum  creatinine using the CKD-EPI equation. Other variables  required for calculation are race, age and sex. Among  patients with chronic kidney disease (CKD), the eGFR is  useful in determining the stage of disease according to  KDOQI CKD classification. All eGFR results are reported  numerically with the following interpretation.    GFR  (ml/min/1.73 m2)          W/KIDNEY DAMAGE    W/O KIDNEY DMG  ==========================================================  >= 90.......................Stage 1..............Normal  60-89.......................Stage 2...........Decreased GFR  30-59.......................Stage 3..............Stage 3  15-29.......................Stage 4..............Stage 4  < 15........................Stage 5..............Stage 5    Each stage of CKD assumes that the associated GFR level  has been in effect for at least 3 months. Determination of  stages one and two (with eGFR > 59ml/min/m2) requires  estimation of kidney damage for at least 3 months as  defined by structural or functional abnormalities.    Limitations: All estimates of GFR will be less accurate  for patients at extremes of muscle mass (including but  not limited to frail elderly, critically ill, or cancer  patients), those with unusual diets, and those with  conditions associated with reduced secretion or  extrarenal elimination of creatinine. The eGFR equation  is not recommended for use in patients with unstable  creatinine levels. mL/min    Sodium, Serum: 136 mmol/L    Potassium, Serum: 4.9 mmol/L    Chloride, Serum: 99 mmol/L    Carbon Dioxide, Serum: 26 mmol/L    Anion Gap, Serum: 11 mmo/L    Blood Urea Nitrogen, Serum: 11 mg/dL    Creatinine, Serum: 0.97 mg/dL    Glucose, Serum: 101 mg/dL    Calcium, Total Serum: 9.9 mg/dL    Protein Total, Serum: 6.5 g/dL    Albumin, Serum: 3.7 g/dL    Bilirubin Total, Serum: 0.7: Delta: 0.4 on 07/01/  Delta: 0.4 on 07/01/ mg/dL    Alkaline Phosphatase, Serum: 179 u/L    Aspartate Aminotransferase (AST/SGOT): 46 u/L    Alanine Aminotransferase (ALT/SGPT): 104 u/L    Magnesium, Serum: 2.0 mg/dL                  LIVER FUNCTIONS - ( 01 Jul 2019 10:40 )  Alb: 4.3 g/dL / Pro: 7.2 g/dL / ALK PHOS: 192 u/L / ALT: 152 u/L / AST: 97 u/L / GGT: x           hep c negative       EKG:    Imaging Studies:< from: Xray Foot AP + Lateral + Oblique, Right (07.01.19 @ 12:08) >  EXAM:  RAD FOOT MIN 3 VIEWS RIGHT        PROCEDURE DATE:  Jul 1 2019         INTERPRETATION:  TIME OF EXAM: July 1, 2019 at 12:54 PM    CLINICAL INFORMATION: Right foot cellulitis    TECHNIQUE:   AP, oblique and lateral radiographs of the right foot.    INTERPRETATION:     There is no acute fracture or dislocation. No subcutaneous air or bone   destruction to indicate osteomyelitis. Soft tissues are intact.      COMPARISON:  None available      IMPRESSION:  No acute bone infection      < from: US Abdomen Limited (07.03.19 @ 10:48) >  EXAM:  US ABDOMEN LIMITED        PROCEDURE DATE:  Jul  3 2019         INTERPRETATION:  CLINICAL INFORMATION: 63-year-old man with increased LFTs    COMPARISON: None available.    TECHNIQUE: Sonography of the right upper quadrant.     FINDINGS:    Liver: Within normal limits.    Bile ducts: Normal caliber.     Gallbladder: Within normal limits.        Pancreas: Not visualized.    Right kidney: 11.3 cm. No hydronephrosis.    Ascites: None.    IVC: Visualized portions are within normal limits.    IMPRESSION:     No abnormality seen          Impression / Plan:

## 2020-04-13 ENCOUNTER — EMERGENCY (EMERGENCY)
Facility: HOSPITAL | Age: 65
LOS: 1 days | Discharge: HOME | End: 2020-04-13
Attending: EMERGENCY MEDICINE | Admitting: EMERGENCY MEDICINE
Payer: COMMERCIAL

## 2020-04-13 VITALS
DIASTOLIC BLOOD PRESSURE: 90 MMHG | OXYGEN SATURATION: 100 % | RESPIRATION RATE: 18 BRPM | SYSTOLIC BLOOD PRESSURE: 146 MMHG | HEART RATE: 92 BPM

## 2020-04-13 VITALS
TEMPERATURE: 98 F | RESPIRATION RATE: 18 BRPM | HEART RATE: 93 BPM | DIASTOLIC BLOOD PRESSURE: 91 MMHG | OXYGEN SATURATION: 100 % | SYSTOLIC BLOOD PRESSURE: 150 MMHG

## 2020-04-13 PROBLEM — I10 ESSENTIAL (PRIMARY) HYPERTENSION: Chronic | Status: ACTIVE | Noted: 2019-07-01

## 2020-04-13 PROBLEM — E78.5 HYPERLIPIDEMIA, UNSPECIFIED: Chronic | Status: ACTIVE | Noted: 2019-07-01

## 2020-04-13 LAB
ALBUMIN SERPL ELPH-MCNC: 4.3 G/DL — SIGNIFICANT CHANGE UP (ref 3.3–5)
ALP SERPL-CCNC: 51 U/L — SIGNIFICANT CHANGE UP (ref 40–120)
ALT FLD-CCNC: 16 U/L — SIGNIFICANT CHANGE UP (ref 4–41)
ANION GAP SERPL CALC-SCNC: 13 MMO/L — SIGNIFICANT CHANGE UP (ref 7–14)
AST SERPL-CCNC: 14 U/L — SIGNIFICANT CHANGE UP (ref 4–40)
BASOPHILS # BLD AUTO: 0.02 K/UL — SIGNIFICANT CHANGE UP (ref 0–0.2)
BASOPHILS NFR BLD AUTO: 0.3 % — SIGNIFICANT CHANGE UP (ref 0–2)
BILIRUB SERPL-MCNC: 0.6 MG/DL — SIGNIFICANT CHANGE UP (ref 0.2–1.2)
BUN SERPL-MCNC: 11 MG/DL — SIGNIFICANT CHANGE UP (ref 7–23)
CALCIUM SERPL-MCNC: 10.1 MG/DL — SIGNIFICANT CHANGE UP (ref 8.4–10.5)
CHLORIDE SERPL-SCNC: 106 MMOL/L — SIGNIFICANT CHANGE UP (ref 98–107)
CO2 SERPL-SCNC: 21 MMOL/L — LOW (ref 22–31)
CREAT SERPL-MCNC: 0.99 MG/DL — SIGNIFICANT CHANGE UP (ref 0.5–1.3)
EOSINOPHIL # BLD AUTO: 0.01 K/UL — SIGNIFICANT CHANGE UP (ref 0–0.5)
EOSINOPHIL NFR BLD AUTO: 0.2 % — SIGNIFICANT CHANGE UP (ref 0–6)
GLUCOSE SERPL-MCNC: 118 MG/DL — HIGH (ref 70–99)
HCT VFR BLD CALC: 39.1 % — SIGNIFICANT CHANGE UP (ref 39–50)
HGB BLD-MCNC: 13.3 G/DL — SIGNIFICANT CHANGE UP (ref 13–17)
IMM GRANULOCYTES NFR BLD AUTO: 0.5 % — SIGNIFICANT CHANGE UP (ref 0–1.5)
LYMPHOCYTES # BLD AUTO: 0.56 K/UL — LOW (ref 1–3.3)
LYMPHOCYTES # BLD AUTO: 9.2 % — LOW (ref 13–44)
MCHC RBC-ENTMCNC: 27.3 PG — SIGNIFICANT CHANGE UP (ref 27–34)
MCHC RBC-ENTMCNC: 34 % — SIGNIFICANT CHANGE UP (ref 32–36)
MCV RBC AUTO: 80.1 FL — SIGNIFICANT CHANGE UP (ref 80–100)
MONOCYTES # BLD AUTO: 0.54 K/UL — SIGNIFICANT CHANGE UP (ref 0–0.9)
MONOCYTES NFR BLD AUTO: 8.9 % — SIGNIFICANT CHANGE UP (ref 2–14)
NEUTROPHILS # BLD AUTO: 4.92 K/UL — SIGNIFICANT CHANGE UP (ref 1.8–7.4)
NEUTROPHILS NFR BLD AUTO: 80.9 % — HIGH (ref 43–77)
NRBC # FLD: 0 K/UL — SIGNIFICANT CHANGE UP (ref 0–0)
OB PNL STL: NEGATIVE — SIGNIFICANT CHANGE UP
PLATELET # BLD AUTO: 236 K/UL — SIGNIFICANT CHANGE UP (ref 150–400)
PMV BLD: 10 FL — SIGNIFICANT CHANGE UP (ref 7–13)
POTASSIUM SERPL-MCNC: 3.9 MMOL/L — SIGNIFICANT CHANGE UP (ref 3.5–5.3)
POTASSIUM SERPL-SCNC: 3.9 MMOL/L — SIGNIFICANT CHANGE UP (ref 3.5–5.3)
PROT SERPL-MCNC: 6.5 G/DL — SIGNIFICANT CHANGE UP (ref 6–8.3)
RBC # BLD: 4.88 M/UL — SIGNIFICANT CHANGE UP (ref 4.2–5.8)
RBC # FLD: 12.7 % — SIGNIFICANT CHANGE UP (ref 10.3–14.5)
SODIUM SERPL-SCNC: 140 MMOL/L — SIGNIFICANT CHANGE UP (ref 135–145)
TROPONIN T, HIGH SENSITIVITY: < 6 NG/L — SIGNIFICANT CHANGE UP (ref ?–14)
WBC # BLD: 6.08 K/UL — SIGNIFICANT CHANGE UP (ref 3.8–10.5)
WBC # FLD AUTO: 6.08 K/UL — SIGNIFICANT CHANGE UP (ref 3.8–10.5)

## 2020-04-13 PROCEDURE — 71045 X-RAY EXAM CHEST 1 VIEW: CPT | Mod: 26

## 2020-04-13 PROCEDURE — 93010 ELECTROCARDIOGRAM REPORT: CPT

## 2020-04-13 PROCEDURE — 99284 EMERGENCY DEPT VISIT MOD MDM: CPT | Mod: 25

## 2020-04-13 RX ORDER — ACETAMINOPHEN 500 MG
650 TABLET ORAL ONCE
Refills: 0 | Status: COMPLETED | OUTPATIENT
Start: 2020-04-13 | End: 2020-04-13

## 2020-04-13 RX ORDER — SODIUM CHLORIDE 9 MG/ML
1000 INJECTION INTRAMUSCULAR; INTRAVENOUS; SUBCUTANEOUS ONCE
Refills: 0 | Status: COMPLETED | OUTPATIENT
Start: 2020-04-13 | End: 2020-04-13

## 2020-04-13 RX ORDER — FAMOTIDINE 10 MG/ML
20 INJECTION INTRAVENOUS ONCE
Refills: 0 | Status: COMPLETED | OUTPATIENT
Start: 2020-04-13 | End: 2020-04-13

## 2020-04-13 RX ADMIN — FAMOTIDINE 20 MILLIGRAM(S): 10 INJECTION INTRAVENOUS at 14:21

## 2020-04-13 RX ADMIN — SODIUM CHLORIDE 1000 MILLILITER(S): 9 INJECTION INTRAMUSCULAR; INTRAVENOUS; SUBCUTANEOUS at 14:21

## 2020-04-13 RX ADMIN — Medication 650 MILLIGRAM(S): at 14:21

## 2020-04-13 NOTE — ED PROVIDER NOTE - PROGRESS NOTE DETAILS
pt no distress. no bleeding in ED. AO3 no resp distress. oxy sat  RA no oxygen requirement. h/h stable, occult neg, trop neg. will dc home.  close pmd follow up. Pt a03 comfortable w plan

## 2020-04-13 NOTE — ED ADULT NURSE NOTE - OBJECTIVE STATEMENT
64-year-old male presents to ED complaining of SOB and dry cough x1 week.  Patient states he's having difficulty clearing his throat.  Also complaining of one episode of blood in stool, patient has large hemorrhoid.  Denies fever, abdominal pain.  Abdomen soft, non-tender to palpation.

## 2020-04-13 NOTE — ED PROVIDER NOTE - OBJECTIVE STATEMENT
63yo M hx of HTN, GERD, Anxiety pw several complaints. States he has felt his mouth dry, starts to cough and "gets nervous" feels "cant breath" Denies any chest pain. no GREWAL. "feeling very weak' Saw pmd started on protonix and xanax with min relief. For one day has had decrease po intake. No N/V/D. A day ago had some blood in stool, no melena. no bleeding today. nl BM. no focal weakness or numbness. not on AC. no fever/chills. no sick contact. no travel.

## 2020-04-13 NOTE — ED PROVIDER NOTE - NSFOLLOWUPCLINICS_GEN_ALL_ED_FT
[facility] - ENT  Otolaryngology (ENT)  [address] [place] [address]  Phone: [phone]  Fax:   Follow Up Time:     [facility] Gastroenterology  Gastroenterology  [address] [place] [id number]  Phone: [phone]  Fax:   Follow Up Time:

## 2020-04-13 NOTE — ED PROVIDER NOTE - PATIENT PORTAL LINK FT
You can access the FollowMyHealth Patient Portal offered by [facility] by registering at the following website: [email or web address]. By joining [employer]’s FollowMyHealth portal, you will also be able to view your health information using other applications (apps) compatible with our system.

## 2020-04-13 NOTE — ED PROVIDER NOTE - PHYSICAL EXAMINATION
anxious male, no distress. not pale. mmm. non icteric. nl posterior oropharynx no exudate.   normal S1-S2  No resp distress. able to speak in full and clear sentences. no wheeze, rales or stridor. pulse ox  RA  soft nontender abdomen. no  rebound. no guarding. no sign of trauma. no CVAT rectal with CECI Douglas, chaperone. Large non thrombosed external hemorrhoid, not bleeding. brown stool. occult sent. temp 100  no pedal edema. no calf tenderness. normal pulses bilateral feet.  Ao3

## 2020-04-14 ENCOUNTER — APPOINTMENT (OUTPATIENT)
Dept: OTOLARYNGOLOGY | Facility: CLINIC | Age: 65
End: 2020-04-14
Payer: COMMERCIAL

## 2020-04-14 VITALS
TEMPERATURE: 98.2 F | DIASTOLIC BLOOD PRESSURE: 88 MMHG | BODY MASS INDEX: 23.86 KG/M2 | WEIGHT: 180 LBS | HEART RATE: 84 BPM | HEIGHT: 73 IN | SYSTOLIC BLOOD PRESSURE: 134 MMHG

## 2020-04-14 DIAGNOSIS — Z86.79 PERSONAL HISTORY OF OTHER DISEASES OF THE CIRCULATORY SYSTEM: ICD-10-CM

## 2020-04-14 PROCEDURE — 31575 DIAGNOSTIC LARYNGOSCOPY: CPT

## 2020-04-14 PROCEDURE — 99204 OFFICE O/P NEW MOD 45 MIN: CPT | Mod: 25

## 2020-04-14 RX ORDER — ROSUVASTATIN CALCIUM 10 MG/1
10 TABLET, FILM COATED ORAL
Refills: 0 | Status: ACTIVE | COMMUNITY

## 2020-04-14 RX ORDER — LOSARTAN POTASSIUM 50 MG/1
50 TABLET, FILM COATED ORAL
Refills: 0 | Status: ACTIVE | COMMUNITY

## 2020-04-14 RX ORDER — CHROMIUM 200 MCG
TABLET ORAL
Refills: 0 | Status: ACTIVE | COMMUNITY

## 2020-04-14 RX ORDER — PANTOPRAZOLE SODIUM 40 MG/1
40 GRANULE, DELAYED RELEASE ORAL
Refills: 0 | Status: ACTIVE | COMMUNITY

## 2020-04-14 RX ORDER — HYDROCHLOROTHIAZIDE 12.5 MG/1
12.5 CAPSULE ORAL
Refills: 0 | Status: ACTIVE | COMMUNITY

## 2020-04-14 RX ORDER — ALPRAZOLAM 0.25 MG/1
0.25 TABLET ORAL
Refills: 0 | Status: ACTIVE | COMMUNITY

## 2020-04-14 NOTE — PHYSICAL EXAM
[de-identified] : crepitus bilat [de-identified] : scant wax bilat [] : septum deviated bilaterally [Midline] : trachea located in midline position [Laryngoscopy Performed] : laryngoscopy was performed, see procedure section for findings [de-identified] : dry [Normal] : no rashes

## 2020-04-14 NOTE — REASON FOR VISIT
[Initial Evaluation] : an initial evaluation for [Gastroesophageal Reflux] : gastroesophageal reflux [Throat Pain] : throat pain

## 2020-04-14 NOTE — ASSESSMENT
[FreeTextEntry1] : GERD- Antireflux recommendations were given to the patient\par Pepcid and Zegerid prescribed\par A formal GI evaluation may be needed and was discussed with the patient.\par \par TMJ dysfunction\par \par Rec-stress reduction\par cont Xanax prn\par \par f/u 2 weeks

## 2020-04-14 NOTE — PROCEDURE
[de-identified] : Reason for the procedure-throat symptoms not adequately evaluated by mirror exam\par After informed verbal consent is obtained. \par The fiberoptic laryngoscope #19 is passed via the  nasal cavity. There is no adenoid hypertrophy of the nasopharynx.  \par The hypopharynx is clear with no lesions or masses. \par The vocal cords are clear intact, within normal limits and mobile bilaterally with  \par evidence of posterior laryngeal erythema and edema and erythema of the arytenoids.\par \par Tongue Base-wnl\par  Larynx-wnl\par Hypopharynx-wnl\par  tongue base, \par vallecular-wnl\par epiglottis-wnl\par subglottis-wnl\par posterior pharyngeal wall-wnl\par \par true vocal cords-wnl\par arytenoids-erythema and edema\par false vocal cords-wnl\par ventricles-wnl \par pyriform sinus-wnl no pooling\par aryepiglottic folds-wnl\par \par

## 2020-04-14 NOTE — HISTORY OF PRESENT ILLNESS
[No] : patient does not have a  history of radiation therapy [de-identified] : 63 yo male\par 1 week h/o severe dry throat and anxiety w/ assoc heartburn\par No SOB\par no fevere or blood per mouth or masses\par no other modifying factors\par no nasal or throat complaints\par Rx-xanax by PMD [Tinnitus] : no tinnitus [Vertigo] : no vertigo [Anxiety] : anxiety [Dizziness] : no dizziness [Headache] : no headache [Hearing Loss] : no hearing loss [Recurrent Otitis Media] : no recurrent otitis media [Otitis Media with Effusion] : no otitis media with effusion [Presbycusis] : no presbycusis [Eustachian Tube Dysfunction] : no eustachian tube dysfunction [Loud Noise Exposure] : no history of loud noise exposure [Smoking] : no smoking [Early Onset Hearing Loss] : no early onset hearing loss [Stroke] : no stroke [Facial Pain] : no facial pain [Facial Pressure] : no facial pressure [Nasal Congestion] : no nasal congestion [Diplopia] : no diplopia [Ear Fullness] : no ear fullness [Allergic Rhinitis] : no allergic rhinitis [Environmental Allergies] : no environmental allergies [Seasonal Allergies] : no seasonal allergies [Environmental Allergens] : no environmental allergens [Adenoidectomy] : no adenoidectomy [Allergies] : no allergies [Asthma] : no asthma [Neck Mass] : no neck mass [Neck Pain] : no neck pain [Chills] : no chills [Cold Intolerance] : no cold intolerance [Cough] : no cough [Fatigue] : no fatigue [Heat Intolerance] : no heat intolerance [Hyperthyroidism] : no hyperthyroidism [Sialadenitis] : no sialadenitis [Hodgkin Disease] : no hodgkin disease [Non-Hodgkin Lymphoma] : no non-hodgkin lymphoma [Tobacco Use] : no tobacco use [Alcohol Use] : no alcohol use [Graves Disease] : no graves disease [Thyroid Cancer] : no thyroid cancer

## 2020-04-17 ENCOUNTER — EMERGENCY (EMERGENCY)
Facility: HOSPITAL | Age: 65
LOS: 1 days | Discharge: HOME | End: 2020-04-17
Admitting: EMERGENCY MEDICINE
Payer: COMMERCIAL

## 2020-04-17 ENCOUNTER — EMERGENCY (EMERGENCY)
Facility: HOSPITAL | Age: 65
LOS: 1 days | Discharge: HOME | End: 2020-04-17
Admitting: STUDENT IN AN ORGANIZED HEALTH CARE EDUCATION/TRAINING PROGRAM
Payer: COMMERCIAL

## 2020-04-17 VITALS
OXYGEN SATURATION: 100 % | RESPIRATION RATE: 16 BRPM | SYSTOLIC BLOOD PRESSURE: 123 MMHG | DIASTOLIC BLOOD PRESSURE: 81 MMHG | HEART RATE: 102 BPM | TEMPERATURE: 98 F

## 2020-04-17 VITALS
SYSTOLIC BLOOD PRESSURE: 128 MMHG | HEART RATE: 108 BPM | DIASTOLIC BLOOD PRESSURE: 92 MMHG | RESPIRATION RATE: 20 BRPM | TEMPERATURE: 98 F | OXYGEN SATURATION: 100 %

## 2020-04-17 PROCEDURE — 99283 EMERGENCY DEPT VISIT LOW MDM: CPT

## 2020-04-17 RX ORDER — ALBUTEROL 90 UG/1
4 AEROSOL, METERED ORAL ONCE
Refills: 0 | Status: COMPLETED | OUTPATIENT
Start: 2020-04-17 | End: 2020-04-17

## 2020-04-17 RX ORDER — DIAZEPAM 5 MG
5 TABLET ORAL ONCE
Refills: 0 | Status: DISCONTINUED | OUTPATIENT
Start: 2020-04-17 | End: 2020-04-17

## 2020-04-17 RX ORDER — DEXAMETHASONE 0.5 MG/5ML
10 ELIXIR ORAL ONCE
Refills: 0 | Status: COMPLETED | OUTPATIENT
Start: 2020-04-17 | End: 2020-04-17

## 2020-04-17 RX ADMIN — ALBUTEROL 4 PUFF(S): 90 AEROSOL, METERED ORAL at 20:01

## 2020-04-17 RX ADMIN — Medication 5 MILLIGRAM(S): at 22:49

## 2020-04-17 RX ADMIN — Medication 10 MILLIGRAM(S): at 23:24

## 2020-04-17 NOTE — ED PROVIDER NOTE - OBJECTIVE STATEMENT
65 y/o M pmh HTN, GERD, anxiety c/o throat tightening sensation x 3 weeks now. Pt seen here earlier tonight. Seen by ENT. Pt was scoped, had chest x-ray and labs done, all were unremarkable. States it feels like he cannot breath at times. Recently was tested positive for COVID at . Also went to ENT for same complaint, states everything was fine. Was seen in this ED for same issue 4 days ago. Recently started on xanax for anxiety by PMD. States earlier today xanax made him feel better. Also has been using cough drops with some relief. Denies fever, chills, cough, cp, abd pain, n/v/d

## 2020-04-17 NOTE — ED PROVIDER NOTE - PMH
Anxiety    GERD (gastroesophageal reflux disease)    HLD (hyperlipidemia)    HTN (hypertension)    Hypertension

## 2020-04-17 NOTE — ED PROVIDER NOTE - PATIENT PORTAL LINK FT
You can access the FollowMyHealth Patient Portal offered by [facility] by registering at the following website: [email or web address]. By joining [employer]’s FollowMyHealth portal, you will also be able to view your health information using other applications (apps) compatible with our system.

## 2020-04-17 NOTE — ED ADULT TRIAGE NOTE - CHIEF COMPLAINT QUOTE
Pt. c/o difficulty breathing with dry cough. stated he was discharged 1hr ago  from ER for not feeling better.

## 2020-04-17 NOTE — ED PROVIDER NOTE - OBJECTIVE STATEMENT
63 y/o M pmh HTN, GERD, anxiety c/o throat tightening sensation x 3 weeks now. States it feels like he cannot breath at times. Recently was tested positive for COVID at . Also went to ENT for same complaint, states everything was fine. Was seen in this ED for same issue 4 days ago. Recently started on xanax for anxiety by PMD. States earlier today xanax made him feel better. Also has been using cough drops with some relief. Denies fever, chills, cough, cp, abd pain, n/v/d.

## 2020-04-17 NOTE — ED PROVIDER NOTE - PATIENT PORTAL LINK FT
You can access the FollowMyHealth Patient Portal offered by [facility] by registering at the following website: [email or web address]. By joining [employer]’s FollowMyHealth portal, you will also be able to view your health information using other applications (apps) compatible with our system.

## 2020-04-17 NOTE — ED PROVIDER NOTE - CLINICAL SUMMARY MEDICAL DECISION MAKING FREE TEXT BOX
anxious appearing male, known COVID, c/o sore throat and tightening sensation x 3 weeks  VSS, slight tachycardia, resting and walking O2 sat 99. throat clear, no swelling or edema, no stridor  covid like illness, ER visit mostly related to anxiety, reassured pt that he is okay and to come back for worsening symptoms  will give inhaler here for pt anxious appearing male, known COVID, c/o sore throat and tightening sensation x 3 weeks  VSS, slight tachycardia, resting and walking O2 sat 99. throat clear, no swelling or edema, no stridor  Pt talking complete sentences during interview and pacing back and forth  covid like illness, ER visit mostly related to anxiety, reassured pt that he is okay and to come back for worsening symptoms  will give inhaler here for pt

## 2020-04-17 NOTE — ED PROVIDER NOTE - CLINICAL SUMMARY MEDICAL DECISION MAKING FREE TEXT BOX
65 yr old M with anxiety and sensation that he cannot breath. Plan to give Valium and Decadron. Dc and f/u with ENT or PCP. 64 yr old M with anxiety and sensation that he cannot breath. Plan to give Valium and Decadron. Dc and f/u with ENT or PCP.

## 2020-04-18 ENCOUNTER — EMERGENCY (EMERGENCY)
Facility: HOSPITAL | Age: 65
LOS: 1 days | Discharge: HOME | End: 2020-04-18
Attending: STUDENT IN AN ORGANIZED HEALTH CARE EDUCATION/TRAINING PROGRAM | Admitting: EMERGENCY MEDICINE
Payer: COMMERCIAL

## 2020-04-18 VITALS
TEMPERATURE: 98 F | OXYGEN SATURATION: 98 % | SYSTOLIC BLOOD PRESSURE: 147 MMHG | DIASTOLIC BLOOD PRESSURE: 78 MMHG | HEART RATE: 97 BPM | RESPIRATION RATE: 20 BRPM

## 2020-04-18 PROCEDURE — 99283 EMERGENCY DEPT VISIT LOW MDM: CPT

## 2020-04-18 NOTE — ED ADULT TRIAGE NOTE - CHIEF COMPLAINT QUOTE
"I feel like my throat is closing up. I was here yesturday for the same issue. they gave me some medication but it's not helping me. I been feeling like this x 3 weeks but worse the last 2 days" patient is covid19 +. no audible stridor noted in triage. speaking in full sentences.

## 2020-04-18 NOTE — ED PROVIDER NOTE - ATTENDING CONTRIBUTION TO CARE
I have personally performed a face to face bedside history and physical examination of this patient. I have discussed the history, examination, review of systems, assessment and plan of management with the resident. I have reviewed the electronic medical record and amended it to reflect my history, review of systems, physical exam, assessment and plan.    64 year old man w/ likely covid preents with "throat closing.  this has been present for several weeks and he has been seen by ENT for this on 2 occassions. He states symptoms are unchanged.  No findings for airway obstruction. Patient is able to range his neck fully, swallow without difficulty and speak clearly.  Likely part of viral syndrome.  Will treat with mucolytics

## 2020-04-18 NOTE — ED PROVIDER NOTE - OBJECTIVE STATEMENT
64M hx htn, hld, anxiety p/w sob. Patient reports many days  Patient has been seen multiple times this week in the ER for similar complaint. 64M hx htn, hld, anxiety p/w sob. Patient reports many days of something like phlegm at the back of his throat. Feels like he can't clear his throat completely even when he coughs. Makes it difficult to swallow and breath sometimes. Denies tongue/lip swelling, rashes. No fevers, difficulty lying flat. Has tried steroids, has been seen by ENT and other than testing positive for Covid 19, has had multiple unremarkable workups in the ER.

## 2020-04-18 NOTE — ED PROVIDER NOTE - CLINICAL SUMMARY MEDICAL DECISION MAKING FREE TEXT BOX
patient is nontoxic appearing. likely congestion/increased mucouse production causing prolonged symptoms. also likely related to anxiety. patient is in no repsiratory distress. mouth/throat exam unremarkable. maintaining airway. not requiring supplemental oxygen. no evidence of infection/upper airway stricture. no respiratory distress. will try giving mucinex and send home with pmd f/u

## 2020-04-18 NOTE — ED ADULT NURSE REASSESSMENT NOTE - NS ED NURSE REASSESS COMMENT FT1
SANDY RN- Patient received to Rm 4, reports he is positive for COVID-19. Pt was seen in the ED yesterday for difficulty breathing and discharged. Pt presents today with c/o difficulty breathing and throat tightness. Pt appears comfortable, respiration is even and unlabored with 100% saturation. VSS. NSR on the cardiac monitor. Placed 20g on L AC. MD to see. Endorsed to CECI Araiza

## 2020-04-18 NOTE — ED PROVIDER NOTE - PATIENT PORTAL LINK FT
You can access the FollowMyHealth Patient Portal offered by [facility] by registering at the following website: [email or web address]. By joining [employer]’s FollowMyHealth portal, you will also be able to view your health information using other applications (apps) compatible with our system.

## 2020-04-18 NOTE — ED PROVIDER NOTE - NSFOLLOWUPINSTRUCTIONS_ED_ALL_ED_FT
Please follow up with your primary care doctor for further evaluation and treatment. Please return to the ER if you feel that you cannot breath, have severe weakness, persistent vomiting, unable to swallow at all, or if you have any other concerns.

## 2020-04-18 NOTE — ED PROVIDER NOTE - ENMT, MLM
Airway patent, Nasal mucosa clear. Mouth with normal mucosa. Throat has no vesicles, no oropharyngeal exudates and uvula is midline. no uvular deviation. no evidence of pta. maintaining secretions. voice slightly hoarse. mild nasal congestion.

## 2020-04-19 VITALS
HEART RATE: 89 BPM | OXYGEN SATURATION: 98 % | RESPIRATION RATE: 18 BRPM | SYSTOLIC BLOOD PRESSURE: 139 MMHG | TEMPERATURE: 98 F | DIASTOLIC BLOOD PRESSURE: 72 MMHG

## 2020-04-19 PROBLEM — F41.9 ANXIETY DISORDER, UNSPECIFIED: Chronic | Status: ACTIVE | Noted: 2020-04-17

## 2020-04-19 PROBLEM — I10 ESSENTIAL (PRIMARY) HYPERTENSION: Chronic | Status: ACTIVE | Noted: 2020-04-17

## 2020-04-19 PROBLEM — K21.9 GASTRO-ESOPHAGEAL REFLUX DISEASE WITHOUT ESOPHAGITIS: Chronic | Status: ACTIVE | Noted: 2020-04-17

## 2020-04-19 RX ADMIN — Medication 600 MILLIGRAM(S): at 01:07

## 2020-04-19 NOTE — ED ADULT NURSE REASSESSMENT NOTE - NS ED NURSE REASSESS COMMENT FT1
Patient received from float CECI leiva, A&OX4, ambulatory. Patient respirations even and unlabored, chest rise equal b/l. VSS. NAD. Will continue to monitor.

## 2020-04-20 ENCOUNTER — APPOINTMENT (OUTPATIENT)
Dept: OTOLARYNGOLOGY | Facility: CLINIC | Age: 65
End: 2020-04-20

## 2020-04-20 ENCOUNTER — EMERGENCY (EMERGENCY)
Facility: HOSPITAL | Age: 65
LOS: 1 days | Discharge: HOME | End: 2020-04-20
Attending: STUDENT IN AN ORGANIZED HEALTH CARE EDUCATION/TRAINING PROGRAM | Admitting: STUDENT IN AN ORGANIZED HEALTH CARE EDUCATION/TRAINING PROGRAM
Payer: COMMERCIAL

## 2020-04-20 VITALS
OXYGEN SATURATION: 100 % | TEMPERATURE: 97 F | HEART RATE: 90 BPM | DIASTOLIC BLOOD PRESSURE: 85 MMHG | SYSTOLIC BLOOD PRESSURE: 120 MMHG | RESPIRATION RATE: 18 BRPM

## 2020-04-20 VITALS
SYSTOLIC BLOOD PRESSURE: 133 MMHG | DIASTOLIC BLOOD PRESSURE: 112 MMHG | OXYGEN SATURATION: 100 % | HEART RATE: 102 BPM | RESPIRATION RATE: 20 BRPM | TEMPERATURE: 98 F

## 2020-04-20 DIAGNOSIS — Z90.89 ACQUIRED ABSENCE OF OTHER ORGANS: Chronic | ICD-10-CM

## 2020-04-20 PROCEDURE — 70360 X-RAY EXAM OF NECK: CPT | Mod: 26

## 2020-04-20 PROCEDURE — 93010 ELECTROCARDIOGRAM REPORT: CPT

## 2020-04-20 PROCEDURE — 99283 EMERGENCY DEPT VISIT LOW MDM: CPT | Mod: 25

## 2020-04-20 RX ORDER — DIAZEPAM 5 MG
5 TABLET ORAL ONCE
Refills: 0 | Status: DISCONTINUED | OUTPATIENT
Start: 2020-04-20 | End: 2020-04-20

## 2020-04-20 RX ORDER — BENZOCAINE AND MENTHOL 5; 1 G/100ML; G/100ML
1 LIQUID ORAL ONCE
Refills: 0 | Status: COMPLETED | OUTPATIENT
Start: 2020-04-20 | End: 2020-04-20

## 2020-04-20 RX ORDER — LIDOCAINE 4 G/100G
10 CREAM TOPICAL ONCE
Refills: 0 | Status: COMPLETED | OUTPATIENT
Start: 2020-04-20 | End: 2020-04-20

## 2020-04-20 RX ADMIN — Medication 30 MILLILITER(S): at 19:35

## 2020-04-20 RX ADMIN — Medication 5 MILLIGRAM(S): at 20:02

## 2020-04-20 RX ADMIN — LIDOCAINE 10 MILLILITER(S): 4 CREAM TOPICAL at 19:35

## 2020-04-20 RX ADMIN — BENZOCAINE AND MENTHOL 1 LOZENGE: 5; 1 LIQUID ORAL at 19:35

## 2020-04-20 NOTE — ED ADULT NURSE NOTE - CHIEF COMPLAINT QUOTE
Pt + COVID with c/o trouble swallowing for several days. Pt was seen at [facility] several times for similar symptoms. Pt states symptoms are getting worse. Pt voice slightly hoarse in ED, but speaking in full sentences. Pt sating at 100% on RA. PMH HTN.

## 2020-04-20 NOTE — ED PROVIDER NOTE - PATIENT PORTAL LINK FT
You can access the FollowMyHealth Patient Portal offered by [facility] by registering at the following website: [email or web address]. By joining [employer]’s FollowMyHealth portal, you will also be able to view your health information using other applications (apps) compatible with our system.

## 2020-04-20 NOTE — ED PROVIDER NOTE - PROGRESS NOTE DETAILS
Alfredito Wright DO: patient received maalox and complained feeling worse and having trouble swallowing. patient speaking in full voice. oropharynx wnl. no stridor. exam unchanged from prior. patient  drinking without issue. patient reports valium he received last visit helped his symptoms completely. given exam wnl, prelim xray wnl (my and rad review), and unchanged ekg suspect symptom poss from viral symptom vs gerd vs anxiety. discussed following up with pcp for xanax dose adjustment (on 0.25mg currently). regarding "carotid disease" states something was found several years ago and follow up with Dr Quijano. most recent appt in jan 2020 and reports he was scanned at that time without new concern. patient not hypoxic and tolerating po. stable for dc with pcp follow up. Return precautions were discussed with patient at bedside and patient expressed understanding.

## 2020-04-20 NOTE — ED ADULT NURSE NOTE - OBJECTIVE STATEMENT
Pt axo x 4.  States he has sensation of "throat closing" for several days.  Pt + for covid.  Pt has hx of similar s/s and has been evaluated in the past.  Pt does have hx of GERD.  Pt to receive xray of neck, pt states medications given has made him feel worse.  Pt has hx of anxiety, observed to be pacing about room, out to desk despite instruction to stay in room.

## 2020-04-20 NOTE — ED PROVIDER NOTE - NSFOLLOWUPINSTRUCTIONS_ED_ALL_ED_FT
1) Please call your primary care doctor for follow up within the next 2-3 days.   2) If you have any worsening of symptoms or any other concerns please return to the ED immediately.  3) Please take your protonix and alprazolam (Xanax) as prescribed and discuss with your doctor regarding adjusting the dose  4) You may have been given a copy of your labs and/or imaging.  Please go over these with your primary care doctor.

## 2020-04-20 NOTE — ED ADULT TRIAGE NOTE - CHIEF COMPLAINT QUOTE
Pt + COVID with c/o trouble swallowing for several days. Pt was seen at [facility] several times for similar symptoms. Pt states symptoms are getting worse. Pt voice slightly hoarse in ED, but speaking in full sentences. Pt sating at 100% on RA. PMH HTN.

## 2020-04-20 NOTE — ED PROVIDER NOTE - PHYSICAL EXAMINATION
PHYSICAL EXAM:   General: well-appearing, appears stated age, in no acute distress  HEENT: NC/AT, airway patent, throat mildly erythematous, no obvious PTA  Cardiovascular: mildly tachycardic rate and regular rhythm, + S1/S2, no murmurs, rubs, gallops appreciated  Respiratory: clear to auscultation bilaterally, good aeration bilaterally, nonlabored respirations  Neuro: Alert and oriented x3. grossly nonfocal neurologic exam, ambulatory  -Jen Spann PGY-2

## 2020-04-20 NOTE — ED ADULT NURSE REASSESSMENT NOTE - NS ED NURSE REASSESS COMMENT FT1
patient aaox3. came in with sore throat, hoarse voice and acid reflux. reports feeling much better after med admin. patient discharged.

## 2020-04-20 NOTE — ED PROVIDER NOTE - OBJECTIVE STATEMENT
65 yo M with pmh HTN, HLD, GERD, anxiety p/w several days of difficulty swallowing, feels as though "throat is closing". Symptoms worse in the afternoon, after eating and with talking. Patient notes his voice is hoarse. No drooling or difficulty handling secretions. No fevers. No chest pain. No cough. Has been taking over the counter pepcid. States symptoms have been worse since starting steroids (on day 3 of medrol dose pack). Was also scoped by ENT before per patient report but no significant findings reported. Has had multiple ED visits for the same with unremarkable workups documented

## 2020-04-20 NOTE — ED PROVIDER NOTE - NS ED ROS FT
REVIEW OF SYSTEMS:  General:  no fever  HEENT: no headache, +difficulty swallowing and sensation of "throat closing" per HPI  Cardiac: no chest pain  Respiratory: no cough, no shortness of breath  Gastrointestinal: no abdominal pain, no nausea, no vomiting  -Jen Spann PGY-2

## 2020-04-20 NOTE — ED PROVIDER NOTE - CLINICAL SUMMARY MEDICAL DECISION MAKING FREE TEXT BOX
Jen Spann MD PGY-2 65 yo m pmh htn hld, gerd, anxiety, p/w difficult swallowing and "throat closing" sensation, worse after eating and in afternoon, worse since starting steroids. endorses hoarse voice. DDx includes but not limited to GERD. Has no known allergies, unlikely allergic reaction given duration and timing of symptoms and clinical history. Unlikely foreign body given history. Unlikely cardiac given not a globus sensation that is described. will get screening ekg, soft tissue neck xray, symptom relief prn. Jen Spann MD PGY-2 63 yo m pmh htn hld, gerd, anxiety, p/w difficult swallowing and "throat closing" sensation, worse after eating and talking and in afternoon, worse since starting steroids. endorses hoarse voice. DDx includes but not limited to GERD. Has no known allergies, unlikely allergic reaction given duration and timing of symptoms and clinical history. Unlikely foreign body given history. Unlikely cardiac given not a globus sensation that is described. will get screening ekg, soft tissue neck xray, symptom relief prn.

## 2020-05-18 ENCOUNTER — APPOINTMENT (OUTPATIENT)
Dept: OTOLARYNGOLOGY | Facility: CLINIC | Age: 65
End: 2020-05-18
Payer: COMMERCIAL

## 2020-05-18 VITALS
BODY MASS INDEX: 22.53 KG/M2 | HEIGHT: 73 IN | HEART RATE: 87 BPM | WEIGHT: 170 LBS | DIASTOLIC BLOOD PRESSURE: 83 MMHG | SYSTOLIC BLOOD PRESSURE: 124 MMHG

## 2020-05-18 DIAGNOSIS — H92.03 OTALGIA, BILATERAL: ICD-10-CM

## 2020-05-18 DIAGNOSIS — H61.23 IMPACTED CERUMEN, BILATERAL: ICD-10-CM

## 2020-05-18 PROCEDURE — 31575 DIAGNOSTIC LARYNGOSCOPY: CPT

## 2020-05-18 PROCEDURE — 99214 OFFICE O/P EST MOD 30 MIN: CPT | Mod: 25

## 2020-05-18 RX ORDER — BENZONATATE 100 MG/1
100 CAPSULE ORAL
Qty: 30 | Refills: 0 | Status: ACTIVE | COMMUNITY
Start: 2020-05-04

## 2020-05-18 RX ORDER — AMOXICILLIN 500 MG/1
500 CAPSULE ORAL
Qty: 10 | Refills: 0 | Status: ACTIVE | COMMUNITY
Start: 2020-03-26

## 2020-05-18 RX ORDER — CYCLOBENZAPRINE HYDROCHLORIDE 10 MG/1
10 TABLET, FILM COATED ORAL
Qty: 30 | Refills: 0 | Status: ACTIVE | COMMUNITY
Start: 2020-01-13

## 2020-05-18 RX ORDER — PANTOPRAZOLE 40 MG/1
40 TABLET, DELAYED RELEASE ORAL
Qty: 60 | Refills: 0 | Status: ACTIVE | COMMUNITY
Start: 2020-05-01

## 2020-05-18 RX ORDER — METHYLPREDNISOLONE 4 MG/1
4 TABLET ORAL
Qty: 21 | Refills: 0 | Status: ACTIVE | COMMUNITY
Start: 2020-04-17

## 2020-05-18 RX ORDER — ALPRAZOLAM 0.5 MG/1
0.5 TABLET ORAL
Qty: 56 | Refills: 0 | Status: ACTIVE | COMMUNITY
Start: 2020-05-06

## 2020-05-18 RX ORDER — HYDROCHLOROTHIAZIDE 12.5 MG/1
12.5 TABLET ORAL
Qty: 30 | Refills: 0 | Status: ACTIVE | COMMUNITY
Start: 2019-11-15

## 2020-05-18 RX ORDER — MELOXICAM 15 MG/1
15 TABLET ORAL
Qty: 30 | Refills: 0 | Status: ACTIVE | COMMUNITY
Start: 2019-11-22

## 2020-05-18 RX ORDER — PREDNISONE 20 MG/1
20 TABLET ORAL
Qty: 14 | Refills: 0 | Status: ACTIVE | COMMUNITY
Start: 2020-01-13

## 2020-05-18 RX ORDER — BUSPIRONE HYDROCHLORIDE 10 MG/1
10 TABLET ORAL
Qty: 60 | Refills: 0 | Status: ACTIVE | COMMUNITY
Start: 2020-05-01

## 2020-05-18 NOTE — HISTORY OF PRESENT ILLNESS
[No] : patient does not have a  history of radiation therapy [Anxiety] : anxiety [de-identified] : 65 yo male\par Patient complains of dry throat. He was tested for COVID several weeks ago then retested negative. Dry throat started after being dx with COVID, worse when talking for long periods of time. He feels like his throat is closing up when speaking, feels out of breath. Does have hx of GERD, takes Omeprazole prn. Is taking xanax for the anxiety with moderate relief. \par no other modifying factors\par \par  [Headache] : no headache [Dizziness] : no dizziness [Otitis Media with Effusion] : no otitis media with effusion [Recurrent Otitis Media] : no recurrent otitis media [Hearing Loss] : no hearing loss [Congenital Ear Malformation] : no congenital ear malformation [Presbycusis] : no presbycusis [Otosclerosis] : no otosclerosis [Meniere Disease] : no Meniere disease [Loud Noise Exposure] : no history of loud noise exposure [Perilymphatic Fistula] : no perilymphatic fistula [Hypertension] : no hypertension [Smoking] : no smoking [Early Onset Hearing Loss] : no early onset hearing loss [Stroke] : no stroke [Facial Pain] : no facial pain [Facial Pressure] : no facial pressure [Nasal Congestion] : no nasal congestion [Cough] : no cough [Diplopia] : no diplopia [Ear Fullness] : no ear fullness [Allergic Rhinitis] : no allergic rhinitis [Maxillary Tooth Infection] : no maxillary tooth infection [Environmental Allergies] : no environmental allergies [Septal Deviation] : no septal deviation [Seasonal Allergies] : no seasonal allergies [Environmental Allergens] : no environmental allergens [Adenoidectomy] : no adenoidectomy [Nasal FB Removal] : no nasal foreign body removal [Allergies] : no allergies [Asthma] : no asthma [Neck Pain] : no neck pain [Neck Mass] : no neck mass [Chills] : no chills [Cold Intolerance] : no cold intolerance [Fatigue] : no fatigue [Hyperthyroidism] : no hyperthyroidism [Heat Intolerance] : no heat intolerance [Hodgkin Disease] : no hodgkin disease [Sialadenitis] : no sialadenitis [Tobacco Use] : no tobacco use [Alcohol Use] : no alcohol use [Non-Hodgkin Lymphoma] : no non-hodgkin lymphoma [Graves Disease] : no graves disease [Thyroid Cancer] : no thyroid cancer

## 2020-05-18 NOTE — PROCEDURE
[de-identified] : Reason for the procedure-throat symptoms not adequately evaluated by mirror exam\par After informed verbal consent is obtained. \par The fiberoptic laryngoscope #3 is passed via the  nasal cavity. There is no adenoid hypertrophy of the nasopharynx.  \par The hypopharynx is clear with no lesions or masses. \par The vocal cords are clear intact, within normal limits and mobile bilaterally with  \par evidence of posterior laryngeal erythema and edema and erythema of the arytenoids.\par \par Tongue Base-wnl\par Larynx-wnl\par Hypopharynx-wnl\par tongue base, \par vallecular-wnl\par epiglottis-wnl\par subglottis-wnl\par posterior pharyngeal wall-wnl\par \par true vocal cords-wnl\par arytenoids-erythema and edema\par false vocal cords-wnl\par ventricles-wnl \par pyriform sinus-wnl no pooling\par aryepiglottic folds-wnl\par \par Pt vomited after flex laryngoscopy\par

## 2020-05-18 NOTE — ASSESSMENT
[FreeTextEntry1] : GERD- Antireflux recommendations were given to the patient\par Pepcid and Zegerid prescribed\par A formal GI evaluation may be needed and was discussed with the patient.\par \par Rec cont GI eval-Dr Bliss\par \par Stress reduction-Xanax as per PMD\par \par pt reassured

## 2020-05-18 NOTE — PHYSICAL EXAM
[de-identified] : wax in right ear [] : septum deviated bilaterally [Midline] : trachea located in midline position [Normal] : no rashes

## 2020-05-18 NOTE — HISTORY OF PRESENT ILLNESS
[No] : patient does not have a  history of radiation therapy [Anxiety] : anxiety [de-identified] : 63 yo male\par Patient complains of dry throat. He was tested for COVID several weeks ago then retested negative. Dry throat started after being dx with COVID, worse when talking for long periods of time. He feels like his throat is closing up when speaking, feels out of breath. Does have hx of GERD, takes Omeprazole prn. Is taking xanax for the anxiety with moderate relief. \par no other modifying factors\par \par  [Headache] : no headache [Dizziness] : no dizziness [Recurrent Otitis Media] : no recurrent otitis media [Hearing Loss] : no hearing loss [Otitis Media with Effusion] : no otitis media with effusion [Presbycusis] : no presbycusis [Congenital Ear Malformation] : no congenital ear malformation [Meniere Disease] : no Meniere disease [Otosclerosis] : no otosclerosis [Loud Noise Exposure] : no history of loud noise exposure [Perilymphatic Fistula] : no perilymphatic fistula [Hypertension] : no hypertension [Early Onset Hearing Loss] : no early onset hearing loss [Smoking] : no smoking [Stroke] : no stroke [Facial Pain] : no facial pain [Facial Pressure] : no facial pressure [Nasal Congestion] : no nasal congestion [Cough] : no cough [Allergic Rhinitis] : no allergic rhinitis [Diplopia] : no diplopia [Ear Fullness] : no ear fullness [Maxillary Tooth Infection] : no maxillary tooth infection [Septal Deviation] : no septal deviation [Environmental Allergies] : no environmental allergies [Seasonal Allergies] : no seasonal allergies [Environmental Allergens] : no environmental allergens [Adenoidectomy] : no adenoidectomy [Nasal FB Removal] : no nasal foreign body removal [Allergies] : no allergies [Asthma] : no asthma [Neck Pain] : no neck pain [Neck Mass] : no neck mass [Cold Intolerance] : no cold intolerance [Chills] : no chills [Fatigue] : no fatigue [Hyperthyroidism] : no hyperthyroidism [Heat Intolerance] : no heat intolerance [Hodgkin Disease] : no hodgkin disease [Sialadenitis] : no sialadenitis [Non-Hodgkin Lymphoma] : no non-hodgkin lymphoma [Tobacco Use] : no tobacco use [Alcohol Use] : no alcohol use [Graves Disease] : no graves disease [Thyroid Cancer] : no thyroid cancer

## 2020-05-18 NOTE — PROCEDURE
[de-identified] : Reason for the procedure-throat symptoms not adequately evaluated by mirror exam\par After informed verbal consent is obtained. \par The fiberoptic laryngoscope #3 is passed via the  nasal cavity. There is no adenoid hypertrophy of the nasopharynx.  \par The hypopharynx is clear with no lesions or masses. \par The vocal cords are clear intact, within normal limits and mobile bilaterally with  \par evidence of posterior laryngeal erythema and edema and erythema of the arytenoids.\par \par Tongue Base-wnl\par Larynx-wnl\par Hypopharynx-wnl\par tongue base, \par vallecular-wnl\par epiglottis-wnl\par subglottis-wnl\par posterior pharyngeal wall-wnl\par \par true vocal cords-wnl\par arytenoids-erythema and edema\par false vocal cords-wnl\par ventricles-wnl \par pyriform sinus-wnl no pooling\par aryepiglottic folds-wnl\par \par Pt vomited after flex laryngoscopy\par

## 2020-05-18 NOTE — PHYSICAL EXAM
[de-identified] : wax in right ear [] : septum deviated bilaterally [Midline] : trachea located in midline position [Normal] : no rashes

## 2020-05-22 ENCOUNTER — APPOINTMENT (OUTPATIENT)
Dept: OTOLARYNGOLOGY | Facility: CLINIC | Age: 65
End: 2020-05-22
Payer: COMMERCIAL

## 2020-05-22 VITALS
HEART RATE: 87 BPM | HEIGHT: 73 IN | TEMPERATURE: 97.6 F | SYSTOLIC BLOOD PRESSURE: 116 MMHG | WEIGHT: 170 LBS | DIASTOLIC BLOOD PRESSURE: 76 MMHG | BODY MASS INDEX: 22.53 KG/M2

## 2020-05-22 DIAGNOSIS — R07.0 PAIN IN THROAT: ICD-10-CM

## 2020-05-22 DIAGNOSIS — K21.9 GASTRO-ESOPHAGEAL REFLUX DISEASE W/OUT ESOPHAGITIS: ICD-10-CM

## 2020-05-22 PROCEDURE — 99214 OFFICE O/P EST MOD 30 MIN: CPT

## 2020-05-22 NOTE — PHYSICAL EXAM
[] : septum deviated bilaterally [Midline] : trachea located in midline position [Normal] : no rashes [de-identified] : wax in right ear

## 2020-05-22 NOTE — HISTORY OF PRESENT ILLNESS
[No] : patient does not have a  history of radiation therapy [Anxiety] : anxiety [de-identified] : 65 yo male \par Patient with of GERD and anxiety presents for follow up, continues to complains of severe  dry mouth and  throat. Taking Biotin with moderate relief.  Is COVID negative. takes Omeprazole prn. Is taking xanax for the anxiety with moderate relief. \par no other modifying factors\par \par  [Headache] : no headache [Dizziness] : no dizziness [Hearing Loss] : no hearing loss [Recurrent Otitis Media] : no recurrent otitis media [Otitis Media with Effusion] : no otitis media with effusion [Presbycusis] : no presbycusis [Meniere Disease] : no Meniere disease [Congenital Ear Malformation] : no congenital ear malformation [Otosclerosis] : no otosclerosis [Perilymphatic Fistula] : no perilymphatic fistula [Hypertension] : no hypertension [Loud Noise Exposure] : no history of loud noise exposure [Smoking] : no smoking [Early Onset Hearing Loss] : no early onset hearing loss [Facial Pain] : no facial pain [Stroke] : no stroke [Facial Pressure] : no facial pressure [Nasal Congestion] : no nasal congestion [Cough] : no cough [Ear Fullness] : no ear fullness [Diplopia] : no diplopia [Allergic Rhinitis] : no allergic rhinitis [Maxillary Tooth Infection] : no maxillary tooth infection [Septal Deviation] : no septal deviation [Environmental Allergies] : no environmental allergies [Environmental Allergens] : no environmental allergens [Seasonal Allergies] : no seasonal allergies [Adenoidectomy] : no adenoidectomy [Nasal FB Removal] : no nasal foreign body removal [Allergies] : no allergies [Asthma] : no asthma [Neck Mass] : no neck mass [Neck Pain] : no neck pain [Cold Intolerance] : no cold intolerance [Chills] : no chills [Fatigue] : no fatigue [Heat Intolerance] : no heat intolerance [Hyperthyroidism] : no hyperthyroidism [Sialadenitis] : no sialadenitis [Non-Hodgkin Lymphoma] : no non-hodgkin lymphoma [Hodgkin Disease] : no hodgkin disease [Tobacco Use] : no tobacco use [Alcohol Use] : no alcohol use [Graves Disease] : no graves disease [Thyroid Cancer] : no thyroid cancer

## 2020-05-22 NOTE — ASSESSMENT
[FreeTextEntry1] : GERD:\par -Antireflux recommendations were given to the patient\par -Pepcid and Zegerid prescribed\par -A formal GI evaluation may be needed and was discussed with the patient.\par -Advised for sugarless sour candy and cranberry juice \par Rec cont GI eval-Dr Bliss\par \par Stress reduction-Xanax as per PMD\par \par pt reassured

## 2020-05-26 ENCOUNTER — APPOINTMENT (OUTPATIENT)
Dept: OTOLARYNGOLOGY | Facility: CLINIC | Age: 65
End: 2020-05-26
Payer: COMMERCIAL

## 2020-05-26 DIAGNOSIS — R68.2 DRY MOUTH, UNSPECIFIED: ICD-10-CM

## 2020-05-26 PROCEDURE — 99442: CPT

## 2020-10-09 ENCOUNTER — APPOINTMENT (OUTPATIENT)
Dept: GASTROENTEROLOGY | Facility: CLINIC | Age: 65
End: 2020-10-09

## 2020-11-28 ENCOUNTER — TRANSCRIPTION ENCOUNTER (OUTPATIENT)
Age: 65
End: 2020-11-28

## 2021-10-19 NOTE — ED ADULT NURSE NOTE - PSH
INFECTIOUS DISEASE OUTPATIENT CONSULTATION    I am seeing Heath Moss for an Infectious Disease Consultation at the request of Nuria Clarke DO .  Heath is a 23 year old adult who presented with Establish Care (I am wanting to get on PrEP) and Consultation (Referred intersted in PrEP )  .      Extensive review of Medical Records was done for this consultation.    HISTORY OF PRESENT ILLNESS  23 year old, who is  non binary . Presents toclinic for a       PAST MEDICAL HISTORY    Iron deficiency anemia due to chronic blood lo*               Anxiety and depression                                        Fibromyalgia                                                  Bipolar 2 disorder (CMS/HCC)                                  Bulimia nervosa                                               ALLERGIES  ALLERGIES:   Allergen Reactions   • Bupropion HIVES       FAMILY HISTORY  Family History   Problem Relation Age of Onset   • Patient is unaware of any medical problems Mother    • Osteoarthritis Father    • Cancer, Breast Maternal Grandmother    • Cancer, Skin Maternal Grandfather    • Dementia/Alzheimers Paternal Grandmother    • Other Sister         HRT - non/binary   • Psychiatric Sister         mood disorder, PTSD   • Cancer, Lung Maternal Great-Grandparent    • Cancer, Pancreatic Paternal Aunt    • Other Paternal Uncle         AIDS       PERSONAL / SOCIAL  Social History     Socioeconomic History   • Marital status: Single     Spouse name: Not on file   • Number of children: Not on file   • Years of education: Not on file   • Highest education level: Not on file   Occupational History   • Not on file   Tobacco Use   • Smoking status: Never Smoker   • Smokeless tobacco: Never Used   Vaping Use   • Vaping Use: never used   Substance and Sexual Activity   • Alcohol use: Yes     Alcohol/week: 0.0 - 2.0 standard drinks   • Drug use: Yes     Types: Marijuana     Comment: several times a week   • Sexual activity:  Yes     Partners: Male, Female     Birth control/protection: I.U.D.   Other Topics Concern   • Not on file   Social History Narrative    Lives with parents. Has 3 cats.    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     Social Determinants of Health     Financial Resource Strain:    • Social Determinants: Financial Resource Strain: Not on file   Food Insecurity:    • Social Determinants: Food Insecurity: Not on file   Transportation Needs:    • Lack of Transportation (Medical): Not on file   • Lack of Transportation (Non-Medical): Not on file   Physical Activity:    • Days of Exercise per Week: Not on file   • Minutes of Exercise per Session: Not on file   Stress:    • Social Determinants: Stress: Not on file   Social Connections:    • Social Determinants: Social Connections: Not on file   Intimate Partner Violence: Not At Risk   • Social Determinants: Intimate Partner Violence Past Fear: No   • Social Determinants: Intimate Partner Violence Current Fear: No       OCCUPATIONAL HISTORY      PETS/TRAVEL  none    MEDICATIONS  Current Outpatient Medications   Medication Sig Dispense Refill   • pregabalin (LYRICA) 50 MG capsule Take 1 capsule by mouth 3 times daily. Indications: Fibromyalgia Syndrome Do not start before October 12, 2021. 90 capsule 0   • risperiDONE (RisperDAL) 2 MG tablet Take 1 tablet by mouth nightly. 90 tablet 0   • DULoxetine (CYMBALTA) 60 MG capsule Take 2 capsules by mouth daily. 60 capsule 1   • lamotrigine (LAMICTAL) 200 MG tablet Take 2 tablets by mouth daily. 60 tablet 1   • propranolol (INDERAL) 20 MG tablet Take 1 tablet by mouth every 12 hours. 60 tablet 1   • trazodone (DESYREL) 150 MG tablet Take 1 tablet by mouth nightly. 30 tablet 1   • levonorgestrel (Mirena, 52 MG,) (52 MG) 20 MCG/DAY intrauterine device        No current facility-administered medications for this visit.       Culture Results:      No results found for: SDES No results found for: CULT    Patient Active Problem List    Diagnosis Date Noted    • Fibromyalgia 09/02/2021     Priority: Low   • Bipolar II disorder (CMS/[facility]) 08/05/2021     Priority: Low   • Eating disorder 08/05/2021     Priority: Low         REVIEW OF SYSTEMS  General: no fever, no pain  HEENT: no visual problems, no hearing issues, no headache  Chest: no coughing, no shortness of breath  CVS: no chest pain, no palpitation  GI: no nausea, vomiting, no diarrhea, no abd pain  : no urinary frequency, no dysuria, no discharge  Ext no edema   Skin: warm and dry to touch  Musculoskeletal: bilateral legs and arms equal range of motion  Neuro: no weakness, no tremors, no headache  Psych: no anxiety, no depression    EXAMINATION  General: Alert and oriented X 3  HEENT: Head atraumatic without any lesions, neck supple and soft. Nose : normal nares. Throat without exudates, ulcers, erythema.  Lungs: Clear to auscultation, no crackles or wheezing.  Chest wall normal.  CV: S1 S2 RRR no murmurs, S3, S4 rubs or gallop.  Abdomen soft and nontender, no hepatomegaly or splenomegaly. BS active in all 4 quadrants.  No edema.  Skin exam: normal  Joint exam : normal  CNS: Cranial nerves II through XII intact. No motor, sensory deficits. Gait, speech normal.    LABORATORY DATA  No components found for: CMP, CBC    Lab Results   Component Value Date    SODIUM 138 07/28/2021    POTASSIUM 3.9 07/28/2021    CHLORIDE 106 07/28/2021    CO2 28 07/28/2021    GLUCOSE 72 07/28/2021    BILIRUBIN 0.5 07/28/2021    GPT 49 07/28/2021    ALKPT 83 07/28/2021    ALBUMIN 3.7 07/28/2021         ASSESSMENT AND PLAN  23 year old, who is  non binary is in clinic to establish care and to Start PReP  Rationale, s/e discussed today  Feels ready and can take a daily pill  Has a partner, monogamous relationship     No known STIs  Orders Placed This Encounter   • HIV Antigen/Antibody Screen   • RPR   • Hepatitis B Surface Antigen   • Hepatitis C Antibody With Reflex   • Chlamydia/Gonorrhea by Nucleic Acid Amplification     RTC 3 months  with labs  After labs today are negative, OK to start Truvada      Thank you very much for the opportunity to participate in this patient's care; will follow with you.      Ayala Estes MD  Infectious Disease    Pager: [phone]  Office: [phone]     History of tonsillectomy

## 2021-11-06 NOTE — ED PROVIDER NOTE - CONDITION AT DISCHARGE:
Routine Rounding this morning for this 34 y/o  37+4wk infant twins. I did a latch assist in the morning and in late afternoon. With the morning attempt neither of the babies were able to latch. Very sleepy. They are pc'ing with supplements (formula). Mom follows attempts with a 20 minute pump session. At the afternoon feeding the boy twin was showing feeding cues. We attempted to latch at the L breast in an Upright Sitting Hold. We tried for several minutes but he would not latch. We took a break and more discussion on feeds with multiples was continued. We switched breasts for the boy and put him on the R breast in same position. Now girl showing feeding cues. We placed her at the L breast in an Upright Sitting Hold and she latched quickly. Dad was telling me that he has been spitty/gaggy. They don't think she likes the formula. I explained how it is harder/more difficult to digest with their immature guts. The girl had a really good feed and I told the parents that this was good because the colostrum is so easy on their guts and will digest easily. I taught Dad paced feeds. The boy took in 15 mls of formula. If they BF well no supplements given. Encouraged them to ask their FC nurse tonight for feed/latch assist. LC to F/U in the morning.  
Satisfactory

## 2022-05-27 NOTE — ED ADULT NURSE NOTE - CHIEF COMPLAINT
The patient is a 64y Male complaining of difficulty breathing.
Standing/Walking/Toileting/Moving from bed to chair

## 2024-09-18 NOTE — ED ADULT NURSE NOTE - NSHOSCREENINGQ1_ED_ALL_ED
HR=68 bpm, FPST=723/65 mmhg, SpO2=98.0 %, Resp=18 B/min, EtCO2=40 mmHg, Apnea=0 Seconds, Pain=0, Sandra=10, K[place]haw=2 No

## 2024-11-26 ENCOUNTER — EMERGENCY (EMERGENCY)
Facility: HOSPITAL | Age: 69
LOS: 1 days | Discharge: HOME | End: 2024-11-26
Attending: STUDENT IN AN ORGANIZED HEALTH CARE EDUCATION/TRAINING PROGRAM | Admitting: STUDENT IN AN ORGANIZED HEALTH CARE EDUCATION/TRAINING PROGRAM
Payer: MEDICARE

## 2024-11-26 VITALS
HEART RATE: 81 BPM | OXYGEN SATURATION: 99 % | SYSTOLIC BLOOD PRESSURE: 139 MMHG | WEIGHT: 171.08 LBS | TEMPERATURE: 98 F | DIASTOLIC BLOOD PRESSURE: 91 MMHG | RESPIRATION RATE: 19 BRPM

## 2024-11-26 VITALS
TEMPERATURE: 98 F | SYSTOLIC BLOOD PRESSURE: 120 MMHG | DIASTOLIC BLOOD PRESSURE: 79 MMHG | RESPIRATION RATE: 18 BRPM | OXYGEN SATURATION: 100 % | HEART RATE: 88 BPM

## 2024-11-26 DIAGNOSIS — Z90.89 ACQUIRED ABSENCE OF OTHER ORGANS: Chronic | ICD-10-CM

## 2024-11-26 LAB
ALBUMIN SERPL ELPH-MCNC: 4.3 G/DL — SIGNIFICANT CHANGE UP (ref 3.3–5)
ALP SERPL-CCNC: 69 U/L — SIGNIFICANT CHANGE UP (ref 40–120)
ALT FLD-CCNC: 16 U/L — SIGNIFICANT CHANGE UP (ref 4–41)
ANION GAP SERPL CALC-SCNC: 12 MMOL/L — SIGNIFICANT CHANGE UP (ref 7–14)
APTT BLD: 35.8 SEC — HIGH (ref 24.5–35.6)
AST SERPL-CCNC: 20 U/L — SIGNIFICANT CHANGE UP (ref 4–40)
BASOPHILS # BLD AUTO: 0.02 K/UL — SIGNIFICANT CHANGE UP (ref 0–0.2)
BASOPHILS NFR BLD AUTO: 0.3 % — SIGNIFICANT CHANGE UP (ref 0–2)
BILIRUB SERPL-MCNC: 0.5 MG/DL — SIGNIFICANT CHANGE UP (ref 0.2–1.2)
BUN SERPL-MCNC: 11 MG/DL — SIGNIFICANT CHANGE UP (ref 7–23)
CALCIUM SERPL-MCNC: 9.5 MG/DL — SIGNIFICANT CHANGE UP (ref 8.4–10.5)
CHLORIDE SERPL-SCNC: 104 MMOL/L — SIGNIFICANT CHANGE UP (ref 98–107)
CO2 SERPL-SCNC: 25 MMOL/L — SIGNIFICANT CHANGE UP (ref 22–31)
CREAT SERPL-MCNC: 1.01 MG/DL — SIGNIFICANT CHANGE UP (ref 0.5–1.3)
EGFR: 81 ML/MIN/1.73M2 — SIGNIFICANT CHANGE UP
EOSINOPHIL # BLD AUTO: 0.01 K/UL — SIGNIFICANT CHANGE UP (ref 0–0.5)
EOSINOPHIL NFR BLD AUTO: 0.2 % — SIGNIFICANT CHANGE UP (ref 0–6)
GLUCOSE SERPL-MCNC: 101 MG/DL — HIGH (ref 70–99)
HCT VFR BLD CALC: 43.5 % — SIGNIFICANT CHANGE UP (ref 39–50)
HGB BLD-MCNC: 14.4 G/DL — SIGNIFICANT CHANGE UP (ref 13–17)
IANC: 5.12 K/UL — SIGNIFICANT CHANGE UP (ref 1.8–7.4)
IMM GRANULOCYTES NFR BLD AUTO: 0.3 % — SIGNIFICANT CHANGE UP (ref 0–0.9)
INR BLD: 0.99 RATIO — SIGNIFICANT CHANGE UP (ref 0.85–1.16)
LIDOCAIN IGE QN: 27 U/L — SIGNIFICANT CHANGE UP (ref 7–60)
LYMPHOCYTES # BLD AUTO: 0.71 K/UL — LOW (ref 1–3.3)
LYMPHOCYTES # BLD AUTO: 11.2 % — LOW (ref 13–44)
MCHC RBC-ENTMCNC: 27 PG — SIGNIFICANT CHANGE UP (ref 27–34)
MCHC RBC-ENTMCNC: 33.1 G/DL — SIGNIFICANT CHANGE UP (ref 32–36)
MCV RBC AUTO: 81.6 FL — SIGNIFICANT CHANGE UP (ref 80–100)
MONOCYTES # BLD AUTO: 0.48 K/UL — SIGNIFICANT CHANGE UP (ref 0–0.9)
MONOCYTES NFR BLD AUTO: 7.5 % — SIGNIFICANT CHANGE UP (ref 2–14)
NEUTROPHILS # BLD AUTO: 5.12 K/UL — SIGNIFICANT CHANGE UP (ref 1.8–7.4)
NEUTROPHILS NFR BLD AUTO: 80.5 % — HIGH (ref 43–77)
NRBC # BLD: 0 /100 WBCS — SIGNIFICANT CHANGE UP (ref 0–0)
NRBC # FLD: 0 K/UL — SIGNIFICANT CHANGE UP (ref 0–0)
PLATELET # BLD AUTO: 265 K/UL — SIGNIFICANT CHANGE UP (ref 150–400)
POTASSIUM SERPL-MCNC: 4.4 MMOL/L — SIGNIFICANT CHANGE UP (ref 3.5–5.3)
POTASSIUM SERPL-SCNC: 4.4 MMOL/L — SIGNIFICANT CHANGE UP (ref 3.5–5.3)
PROT SERPL-MCNC: 6.4 G/DL — SIGNIFICANT CHANGE UP (ref 6–8.3)
PROTHROM AB SERPL-ACNC: 11.8 SEC — SIGNIFICANT CHANGE UP (ref 9.9–13.4)
RBC # BLD: 5.33 M/UL — SIGNIFICANT CHANGE UP (ref 4.2–5.8)
RBC # FLD: 13 % — SIGNIFICANT CHANGE UP (ref 10.3–14.5)
SODIUM SERPL-SCNC: 141 MMOL/L — SIGNIFICANT CHANGE UP (ref 135–145)
TROPONIN T, HIGH SENSITIVITY RESULT: 7 NG/L — SIGNIFICANT CHANGE UP
TSH SERPL-MCNC: 1.1 UIU/ML — SIGNIFICANT CHANGE UP (ref 0.27–4.2)
WBC # BLD: 6.36 K/UL — SIGNIFICANT CHANGE UP (ref 3.8–10.5)
WBC # FLD AUTO: 6.36 K/UL — SIGNIFICANT CHANGE UP (ref 3.8–10.5)

## 2024-11-26 PROCEDURE — 70481 CT ORBIT/EAR/FOSSA W/DYE: CPT | Mod: 26,59,MC

## 2024-11-26 PROCEDURE — 99285 EMERGENCY DEPT VISIT HI MDM: CPT | Mod: GC

## 2024-11-26 PROCEDURE — 70450 CT HEAD/BRAIN W/O DYE: CPT | Mod: 26,59,MC

## 2024-11-26 PROCEDURE — 70496 CT ANGIOGRAPHY HEAD: CPT | Mod: 26,MC

## 2024-11-26 PROCEDURE — 70498 CT ANGIOGRAPHY NECK: CPT | Mod: 26,MC

## 2024-11-26 RX ORDER — SODIUM CHLORIDE 9 MG/ML
1000 INJECTION, SOLUTION INTRAMUSCULAR; INTRAVENOUS; SUBCUTANEOUS ONCE
Refills: 0 | Status: COMPLETED | OUTPATIENT
Start: 2024-11-26 | End: 2024-11-26

## 2024-11-26 RX ORDER — FAMOTIDINE 10 MG/ML
20 INJECTION INTRAVENOUS ONCE
Refills: 0 | Status: COMPLETED | OUTPATIENT
Start: 2024-11-26 | End: 2024-11-26

## 2024-11-26 RX ORDER — MAGNESIUM, ALUMINUM HYDROXIDE 200-200 MG
20 TABLET,CHEWABLE ORAL ONCE
Refills: 0 | Status: COMPLETED | OUTPATIENT
Start: 2024-11-26 | End: 2024-11-26

## 2024-11-26 RX ORDER — ONDANSETRON HYDROCHLORIDE 2 MG/ML
4 INJECTION, SOLUTION INTRAMUSCULAR; INTRAVENOUS ONCE
Refills: 0 | Status: COMPLETED | OUTPATIENT
Start: 2024-11-26 | End: 2024-11-26

## 2024-11-26 RX ADMIN — Medication 20 MILLILITER(S): at 09:19

## 2024-11-26 RX ADMIN — FAMOTIDINE 20 MILLIGRAM(S): 10 INJECTION INTRAVENOUS at 09:22

## 2024-11-26 RX ADMIN — ONDANSETRON HYDROCHLORIDE 4 MILLIGRAM(S): 2 INJECTION, SOLUTION INTRAMUSCULAR; INTRAVENOUS at 09:22

## 2024-11-26 RX ADMIN — SODIUM CHLORIDE 1000 MILLILITER(S): 9 INJECTION, SOLUTION INTRAMUSCULAR; INTRAVENOUS; SUBCUTANEOUS at 09:20

## 2024-11-26 NOTE — ED PROVIDER NOTE - PRO INTERPRETER NEED 2
[facility] arrived for transport to [facility]. VSS, RA, SL in place. No complaints, signs of discomfort/distress at this time.       Echo Nguyễn RN  04/25/22 1950 English

## 2024-11-26 NOTE — ED PROVIDER NOTE - NSFOLLOWUPCLINICS_GEN_ALL_ED_FT
[facility]  Neurology  [address] [place] [address] [address] [place] [address]  Phone: [phone]  Fax:   Follow Up Time: 1-3 Days
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
## 2024-11-26 NOTE — ED PROVIDER NOTE - NSFOLLOWUPINSTRUCTIONS_ED_ALL_ED_FT
You were seen in the emergency department for ear ringing. CT imaging were obtained and showed the following: " High riding right jugular bulb which may be implicated in tinnitus, 0.2 cm right supraclinoid ICA outpouching representing tiny aneurysm versus infundibulum". Given these findings, I am recommending you follow up with a neurologist regarding these findings.     If you develop fever, significant headache, lightheadedness, dizziness, chest pain, shortness of breath,  or for any other questions or concerns please return to the emergency department.    For pain you may take 500 mg of Tylenol every 6 hours.  Alternatively you may take 600 mg of ibuprofen with food every 6 hours.     You should also follow-up with your primary care doctor in the next 1-3 days for further management of your symptoms.
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
## 2024-11-26 NOTE — ED PROVIDER NOTE - PATIENT PORTAL LINK FT
You can access the FollowMyHealth Patient Portal offered by [facility] by registering at the following website: [email or web address]. By joining [facility]’s FollowMyHealth portal, you will also be able to view your health information using other applications (apps) compatible with our system.

## 2024-11-26 NOTE — ED PROVIDER NOTE - PHYSICAL EXAMINATION
GEN: Patient awake and alert. No acute distress, non-toxic.  Head: normocephalic, atraumatic. No bacon sign  Neck: Nontender, full ROM. no c-spine tenderness  Eyes: PERRLA. no scleral icterus, no conjunctival injection. Moist mucous membranes.  ENT: TM normal b/l. Throat without exudates, uvula midline, no vesicles.   CARDIAC: RRR. Normal S1, S2. No murmur, rubs, or gallops. No peripheral edema noted.  PULM: CTA B/L no wheeze, rales or rhonchi. No signs of respiratory distress, no accessory muscle usage or nasal flaring.  ABD: Soft, nontender, nondistended. No rebound, no involuntary guarding.  : No suprapubic tenderness  MSK: Moving all extremities spontaneously. 5/5 strength and full ROM in all extremities. No spinal tenderness  NEURO: A&Ox3, no focal neurological deficits, CN 2-12 grossly intact. Following simple commands. FTN. Gait normal.  SKIN: Warm, dry, no rash
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
## 2024-11-26 NOTE — ED PROVIDER NOTE - PROGRESS NOTE DETAILS
Patient resting comfortably in bed, no acute complaints however does continue to endorse tinnitus.  Laboratory work nonactionable.  CT imaging significant for no acute intracranial findings, "high riding right jugular bulb may be implicated in tinnitus, 0.2 cm right supraclinoid ICA outpouching representing tiny aneurysm versus infundibulum".  Have informed patient about these results.  Given no acute findings, will recommend patient follow-up with neurology.  Patient does say he has appointment on the ninth will provide follow-up to neurology to expedite appointment.  Otherwise will discharge patient now.  Patient is amenable to this plan.

## 2024-11-28 ENCOUNTER — EMERGENCY (EMERGENCY)
Facility: HOSPITAL | Age: 69
LOS: 1 days | Discharge: HOME | End: 2024-11-28
Attending: EMERGENCY MEDICINE | Admitting: EMERGENCY MEDICINE
Payer: COMMERCIAL

## 2024-11-28 VITALS
HEIGHT: 73 IN | WEIGHT: 171.08 LBS | SYSTOLIC BLOOD PRESSURE: 129 MMHG | TEMPERATURE: 98 F | HEART RATE: 90 BPM | RESPIRATION RATE: 16 BRPM | DIASTOLIC BLOOD PRESSURE: 84 MMHG | OXYGEN SATURATION: 100 %

## 2024-11-28 VITALS
HEART RATE: 74 BPM | DIASTOLIC BLOOD PRESSURE: 98 MMHG | RESPIRATION RATE: 15 BRPM | OXYGEN SATURATION: 100 % | SYSTOLIC BLOOD PRESSURE: 118 MMHG | TEMPERATURE: 98 F

## 2024-11-28 DIAGNOSIS — Z90.89 ACQUIRED ABSENCE OF OTHER ORGANS: Chronic | ICD-10-CM

## 2024-11-28 LAB
ALBUMIN SERPL ELPH-MCNC: 4 G/DL — SIGNIFICANT CHANGE UP (ref 3.3–5)
ALP SERPL-CCNC: 64 U/L — SIGNIFICANT CHANGE UP (ref 40–120)
ALT FLD-CCNC: 15 U/L — SIGNIFICANT CHANGE UP (ref 4–41)
ANION GAP SERPL CALC-SCNC: 12 MMOL/L — SIGNIFICANT CHANGE UP (ref 7–14)
AST SERPL-CCNC: 12 U/L — SIGNIFICANT CHANGE UP (ref 4–40)
BASOPHILS # BLD AUTO: 0.02 K/UL — SIGNIFICANT CHANGE UP (ref 0–0.2)
BASOPHILS NFR BLD AUTO: 0.3 % — SIGNIFICANT CHANGE UP (ref 0–2)
BILIRUB SERPL-MCNC: 0.8 MG/DL — SIGNIFICANT CHANGE UP (ref 0.2–1.2)
BUN SERPL-MCNC: 9 MG/DL — SIGNIFICANT CHANGE UP (ref 7–23)
CALCIUM SERPL-MCNC: 9.5 MG/DL — SIGNIFICANT CHANGE UP (ref 8.4–10.5)
CHLORIDE SERPL-SCNC: 105 MMOL/L — SIGNIFICANT CHANGE UP (ref 98–107)
CO2 SERPL-SCNC: 24 MMOL/L — SIGNIFICANT CHANGE UP (ref 22–31)
CREAT SERPL-MCNC: 1.06 MG/DL — SIGNIFICANT CHANGE UP (ref 0.5–1.3)
EGFR: 76 ML/MIN/1.73M2 — SIGNIFICANT CHANGE UP
EGFR: 76 ML/MIN/1.73M2 — SIGNIFICANT CHANGE UP
EOSINOPHIL # BLD AUTO: 0.03 K/UL — SIGNIFICANT CHANGE UP (ref 0–0.5)
EOSINOPHIL NFR BLD AUTO: 0.4 % — SIGNIFICANT CHANGE UP (ref 0–6)
GLUCOSE SERPL-MCNC: 98 MG/DL — SIGNIFICANT CHANGE UP (ref 70–99)
HCT VFR BLD CALC: 39.1 % — SIGNIFICANT CHANGE UP (ref 39–50)
HGB BLD-MCNC: 13.5 G/DL — SIGNIFICANT CHANGE UP (ref 13–17)
IANC: 5.9 K/UL — SIGNIFICANT CHANGE UP (ref 1.8–7.4)
IMM GRANULOCYTES NFR BLD AUTO: 0.3 % — SIGNIFICANT CHANGE UP (ref 0–0.9)
LIDOCAIN IGE QN: 22 U/L — SIGNIFICANT CHANGE UP (ref 7–60)
LYMPHOCYTES # BLD AUTO: 0.85 K/UL — LOW (ref 1–3.3)
LYMPHOCYTES # BLD AUTO: 11.5 % — LOW (ref 13–44)
MAGNESIUM SERPL-MCNC: 2.2 MG/DL — SIGNIFICANT CHANGE UP (ref 1.6–2.6)
MCHC RBC-ENTMCNC: 27.9 PG — SIGNIFICANT CHANGE UP (ref 27–34)
MCHC RBC-ENTMCNC: 34.5 G/DL — SIGNIFICANT CHANGE UP (ref 32–36)
MCV RBC AUTO: 80.8 FL — SIGNIFICANT CHANGE UP (ref 80–100)
MONOCYTES # BLD AUTO: 0.58 K/UL — SIGNIFICANT CHANGE UP (ref 0–0.9)
MONOCYTES NFR BLD AUTO: 7.8 % — SIGNIFICANT CHANGE UP (ref 2–14)
NEUTROPHILS # BLD AUTO: 5.9 K/UL — SIGNIFICANT CHANGE UP (ref 1.8–7.4)
NEUTROPHILS NFR BLD AUTO: 79.7 % — HIGH (ref 43–77)
NRBC # BLD AUTO: 0 K/UL — SIGNIFICANT CHANGE UP (ref 0–0)
NRBC # BLD: 0 /100 WBCS — SIGNIFICANT CHANGE UP (ref 0–0)
NRBC # FLD: 0 K/UL — SIGNIFICANT CHANGE UP (ref 0–0)
NRBC BLD-RTO: 0 /100 WBCS — SIGNIFICANT CHANGE UP (ref 0–0)
PHOSPHATE SERPL-MCNC: 2.9 MG/DL — SIGNIFICANT CHANGE UP (ref 2.5–4.5)
PLATELET # BLD AUTO: 239 K/UL — SIGNIFICANT CHANGE UP (ref 150–400)
POTASSIUM SERPL-MCNC: 4.1 MMOL/L — SIGNIFICANT CHANGE UP (ref 3.5–5.3)
POTASSIUM SERPL-SCNC: 4.1 MMOL/L — SIGNIFICANT CHANGE UP (ref 3.5–5.3)
PROT SERPL-MCNC: 6 G/DL — SIGNIFICANT CHANGE UP (ref 6–8.3)
RBC # BLD: 4.84 M/UL — SIGNIFICANT CHANGE UP (ref 4.2–5.8)
RBC # FLD: 12.7 % — SIGNIFICANT CHANGE UP (ref 10.3–14.5)
SODIUM SERPL-SCNC: 141 MMOL/L — SIGNIFICANT CHANGE UP (ref 135–145)
WBC # BLD: 7.4 K/UL — SIGNIFICANT CHANGE UP (ref 3.8–10.5)
WBC # FLD AUTO: 7.4 K/UL — SIGNIFICANT CHANGE UP (ref 3.8–10.5)

## 2024-11-28 PROCEDURE — 93010 ELECTROCARDIOGRAM REPORT: CPT

## 2024-11-28 PROCEDURE — 74177 CT ABD & PELVIS W/CONTRAST: CPT | Mod: 26,MC

## 2024-11-28 PROCEDURE — 99285 EMERGENCY DEPT VISIT HI MDM: CPT

## 2024-11-28 RX ORDER — MAGNESIUM, ALUMINUM HYDROXIDE 200-200 MG
30 TABLET,CHEWABLE ORAL ONCE
Refills: 0 | Status: COMPLETED | OUTPATIENT
Start: 2024-11-28 | End: 2024-11-28

## 2024-11-28 RX ORDER — ONDANSETRON HCL/PF 4 MG/2 ML
4 VIAL (ML) INJECTION ONCE
Refills: 0 | Status: COMPLETED | OUTPATIENT
Start: 2024-11-28 | End: 2024-11-28

## 2024-11-28 RX ORDER — ONDANSETRON HCL/PF 4 MG/2 ML
1 VIAL (ML) INJECTION
Qty: 15 | Refills: 0
Start: 2024-11-28 | End: 2024-12-02

## 2024-11-28 RX ADMIN — Medication 30 MILLILITER(S): at 03:57

## 2024-11-28 RX ADMIN — Medication 1000 MILLILITER(S): at 03:56

## 2024-11-28 RX ADMIN — Medication 20 MILLIGRAM(S): at 03:57

## 2024-11-28 RX ADMIN — Medication 4 MILLIGRAM(S): at 03:56

## 2024-12-09 ENCOUNTER — APPOINTMENT (OUTPATIENT)
Dept: SURGERY | Facility: CLINIC | Age: 69
End: 2024-12-09
Payer: COMMERCIAL

## 2024-12-09 VITALS
BODY MASS INDEX: 21.74 KG/M2 | DIASTOLIC BLOOD PRESSURE: 87 MMHG | SYSTOLIC BLOOD PRESSURE: 133 MMHG | HEART RATE: 85 BPM | HEIGHT: 73 IN | WEIGHT: 164 LBS | TEMPERATURE: 97.4 F

## 2024-12-09 DIAGNOSIS — K40.90 UNILATERAL INGUINAL HERNIA, W/OUT OBSTRUCTION OR GANGRENE, NOT SPECIFIED AS RECURRENT: ICD-10-CM

## 2024-12-09 DIAGNOSIS — Z78.9 OTHER SPECIFIED HEALTH STATUS: ICD-10-CM

## 2024-12-09 PROCEDURE — 99203 OFFICE O/P NEW LOW 30 MIN: CPT

## 2024-12-09 RX ORDER — ONDANSETRON 4 MG/1
4 TABLET ORAL
Refills: 0 | Status: ACTIVE | COMMUNITY

## 2024-12-09 RX ORDER — FAMOTIDINE 10 MG/1
TABLET, FILM COATED ORAL
Refills: 0 | Status: ACTIVE | COMMUNITY

## 2025-01-02 ENCOUNTER — OUTPATIENT (OUTPATIENT)
Dept: OUTPATIENT SERVICES | Facility: HOSPITAL | Age: 70
LOS: 1 days | Discharge: HOME | End: 2025-01-02
Payer: COMMERCIAL

## 2025-01-02 ENCOUNTER — TRANSCRIPTION ENCOUNTER (OUTPATIENT)
Age: 70
End: 2025-01-02

## 2025-01-02 ENCOUNTER — APPOINTMENT (OUTPATIENT)
Dept: SURGERY | Facility: HOSPITAL | Age: 70
End: 2025-01-02

## 2025-01-02 VITALS
SYSTOLIC BLOOD PRESSURE: 155 MMHG | TEMPERATURE: 98 F | HEIGHT: 73 IN | DIASTOLIC BLOOD PRESSURE: 97 MMHG | WEIGHT: 156.97 LBS | HEART RATE: 90 BPM | RESPIRATION RATE: 17 BRPM | OXYGEN SATURATION: 97 %

## 2025-01-02 VITALS
HEART RATE: 57 BPM | SYSTOLIC BLOOD PRESSURE: 113 MMHG | RESPIRATION RATE: 18 BRPM | OXYGEN SATURATION: 99 % | DIASTOLIC BLOOD PRESSURE: 74 MMHG

## 2025-01-02 DIAGNOSIS — Z98.890 OTHER SPECIFIED POSTPROCEDURAL STATES: Chronic | ICD-10-CM

## 2025-01-02 DIAGNOSIS — Z90.89 ACQUIRED ABSENCE OF OTHER ORGANS: Chronic | ICD-10-CM

## 2025-01-02 PROCEDURE — S2900 ROBOTIC SURGICAL SYSTEM: CPT

## 2025-01-02 PROCEDURE — 49650 LAP ING HERNIA REPAIR INIT: CPT | Mod: 50

## 2025-01-02 PROCEDURE — 49650 LAP ING HERNIA REPAIR INIT: CPT | Mod: AS,50

## 2025-01-02 PROCEDURE — S2900 ROBOTIC SURGICAL SYSTEM: CPT | Mod: NC

## 2025-01-02 DEVICE — MESH HERNIA INGUINAL PROGRIP LAPAROSCOPIC 15 X 10CM RIGHT: Type: IMPLANTABLE DEVICE | Site: BILATERAL INGUINAL | Status: FUNCTIONAL

## 2025-01-02 DEVICE — MESH HERNIA INGUINAL PROGRIP LAPAROSCOPIC 15 X 10CM LEFT: Type: IMPLANTABLE DEVICE | Site: BILATERAL INGUINAL | Status: FUNCTIONAL

## 2025-01-02 RX ORDER — FENTANYL 75 UG/H
50 PATCH, EXTENDED RELEASE TRANSDERMAL
Refills: 0 | Status: DISCONTINUED | OUTPATIENT
Start: 2025-01-02 | End: 2025-01-02

## 2025-01-02 RX ORDER — ASPIRIN 81 MG
0 TABLET, DELAYED RELEASE (ENTERIC COATED) ORAL
Refills: 0 | DISCHARGE

## 2025-01-02 RX ORDER — SODIUM CHLORIDE 9 MG/ML
1000 INJECTION, SOLUTION INTRAVENOUS
Refills: 0 | Status: DISCONTINUED | OUTPATIENT
Start: 2025-01-02 | End: 2025-01-02

## 2025-01-02 RX ORDER — FENTANYL 75 UG/H
25 PATCH, EXTENDED RELEASE TRANSDERMAL
Refills: 0 | Status: DISCONTINUED | OUTPATIENT
Start: 2025-01-02 | End: 2025-01-02

## 2025-01-02 RX ORDER — SODIUM CHLORIDE 9 MG/ML
3 INJECTION, SOLUTION INTRAMUSCULAR; INTRAVENOUS; SUBCUTANEOUS EVERY 8 HOURS
Refills: 0 | Status: DISCONTINUED | OUTPATIENT
Start: 2025-01-02 | End: 2025-01-02

## 2025-01-02 RX ORDER — ONDANSETRON 4 MG/1
4 TABLET ORAL ONCE
Refills: 0 | Status: DISCONTINUED | OUTPATIENT
Start: 2025-01-02 | End: 2025-01-02

## 2025-01-02 RX ADMIN — FENTANYL 25 MICROGRAM(S): 75 PATCH, EXTENDED RELEASE TRANSDERMAL at 12:29

## 2025-01-02 RX ADMIN — SODIUM CHLORIDE 75 MILLILITER(S): 9 INJECTION, SOLUTION INTRAVENOUS at 11:56

## 2025-01-02 RX ADMIN — FENTANYL 25 MICROGRAM(S): 75 PATCH, EXTENDED RELEASE TRANSDERMAL at 11:56

## 2025-01-02 NOTE — ASU PATIENT PROFILE, ADULT - BRADEN SCORE (IF 18 OR LESS ACTIVATE SKIN INJURY RISK INCREASED GUIDELINE), MLM
Pt here for Gemzar/Cisplatin and IVF with K+ and Mg+.  Assessment complete and labs reviewed.  PAC accessed prior to treatment.  VSS.  Pt tolerated infusion well.  No reaction suspected.  Flushed PAC with NS and heparin prior to de-accessing.  No questions or concerns this time.    23

## 2025-01-02 NOTE — ASU PATIENT PROFILE, ADULT - NSICDXPASTMEDICALHX_GEN_ALL_CORE_FT
PAST MEDICAL HISTORY:  Anxiety     GERD (gastroesophageal reflux disease)     HLD (hyperlipidemia)     HTN (hypertension)     Hypertension

## 2025-01-02 NOTE — H&P ADULT - ASSESSMENT
69M presents for inguinal hernia repair.     Plan:  - proceed with scheduled procedure  - anticipate dc home post op

## 2025-01-02 NOTE — ASU DISCHARGE PLAN (ADULT/PEDIATRIC) - C. MAKE IMPORTANT PERSONAL OR BUSINESS DECISIONS
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
Statement Selected

## 2025-01-02 NOTE — ASU DISCHARGE PLAN (ADULT/PEDIATRIC) - FINANCIAL ASSISTANCE
[facility] provides services at a reduced cost to those who are determined to be eligible through [facility]’s financial assistance program. Information regarding [facility]’s financial assistance program can be found by going to [email or web address] or by calling [phone].

## 2025-01-02 NOTE — BRIEF OPERATIVE NOTE - NSICDXBRIEFPROCEDURE_GEN_ALL_CORE_FT
PROCEDURES:  Robot-assisted laparoscopic bilateral inguinal herniorrhaphy 02-Jan-2025 11:19:30  Richard Salmeron

## 2025-01-02 NOTE — H&P ADULT - HISTORY OF PRESENT ILLNESS
SURGERY CONSULT NOTE  --------------------------------------------------------------------------------------------    Patient is a 69y old  Male who presents with a chief complaint of inguinal hernia     HPI: 69M presents for elective inguinal hernia repair, no obstructive symptoms, feels well today without complaints, NPO for procedure.       ROS: 10-system review is otherwise negative except HPI above.      PAST MEDICAL & SURGICAL HISTORY:  Hypertension      HLD (hyperlipidemia)      HTN (hypertension)      GERD (gastroesophageal reflux disease)      Anxiety      History of tonsillectomy      H/O endoscopy        FAMILY HISTORY:      SOCIAL HISTORY:      ALLERGIES: No Known Allergies      HOME MEDICATIONS:     CURRENT MEDICATIONS  MEDICATIONS (STANDING): sodium chloride 0.9% lock flush 3 milliLiter(s) IV Push every 8 hours    MEDICATIONS (PRN):  --------------------------------------------------------------------------------------------    Vitals:   T(C): 36.8 (01-02-25 @ 09:01), Max: 36.8 (01-02-25 @ 09:01)  HR: 90 (01-02-25 @ 09:01) (90 - 90)  BP: 155/97 (01-02-25 @ 09:01) (155/97 - 155/97)  RR: 17 (01-02-25 @ 09:01) (17 - 17)  SpO2: 97% (01-02-25 @ 09:01) (97% - 97%)  CAPILLARY BLOOD GLUCOSE      Height (cm): 185.4 (01-02 @ 09:01)  Weight (kg): 71.2 (01-02 @ 09:01)  BMI (kg/m2): 20.7 (01-02 @ 09:01)  BSA (m2): 1.94 (01-02 @ 09:01)    PHYSICAL EXAM:   General: NAD, sitting in chair comfortably  Neuro: A+Ox3  HEENT: NC/AT, EOMI  Cardio: RRR   Resp: Good effort, room air   GI/Abd: Soft, NT/ND, no rebound/guarding, bilateral groin fullness   Skin: Intact, no breakdown  Lymphatic/Nodes: No palpable lymphadenopathy  Musculoskeletal: All 4 extremities moving spontaneously, no limitations.  --------------------------------------------------------------------------------------------    LABS                --------------------------------------------------------------------------------------------    MICROBIOLOGY      --------------------------------------------------------------------------------------------    IMAGING

## 2025-01-06 DIAGNOSIS — K21.9 GASTRO-ESOPHAGEAL REFLUX DISEASE WITHOUT ESOPHAGITIS: ICD-10-CM

## 2025-01-06 DIAGNOSIS — F41.9 ANXIETY DISORDER, UNSPECIFIED: ICD-10-CM

## 2025-01-06 DIAGNOSIS — K40.20 BILATERAL INGUINAL HERNIA, WITHOUT OBSTRUCTION OR GANGRENE, NOT SPECIFIED AS RECURRENT: ICD-10-CM

## 2025-01-06 DIAGNOSIS — E78.5 HYPERLIPIDEMIA, UNSPECIFIED: ICD-10-CM

## 2025-01-06 DIAGNOSIS — I10 ESSENTIAL (PRIMARY) HYPERTENSION: ICD-10-CM

## 2025-01-06 DIAGNOSIS — Z87.891 PERSONAL HISTORY OF NICOTINE DEPENDENCE: ICD-10-CM

## 2025-01-06 DIAGNOSIS — Z79.82 LONG TERM (CURRENT) USE OF ASPIRIN: ICD-10-CM

## 2025-01-13 ENCOUNTER — APPOINTMENT (OUTPATIENT)
Dept: SURGERY | Facility: CLINIC | Age: 70
End: 2025-01-13
Payer: COMMERCIAL

## 2025-01-13 VITALS
DIASTOLIC BLOOD PRESSURE: 84 MMHG | BODY MASS INDEX: 20.28 KG/M2 | HEIGHT: 73 IN | HEART RATE: 103 BPM | WEIGHT: 153 LBS | TEMPERATURE: 98.2 F | SYSTOLIC BLOOD PRESSURE: 127 MMHG

## 2025-01-13 PROCEDURE — 99211 OFF/OP EST MAY X REQ PHY/QHP: CPT

## 2025-07-11 NOTE — DISCHARGE NOTE PROVIDER - NSDCACTIVITY_GEN_ALL_CORE
Notify provider to clarify medication timing / administration Bathing allowed/Walking - Outdoors allowed/Walking - Indoors allowed

## (undated) DEVICE — TUBING STRYKER PNEUMOCLEAR SMOKE EVACUATION HIGH FLOW

## (undated) DEVICE — SUT VLOC 180 0 12" GS-21 GREEN

## (undated) DEVICE — INSUFFLATION NDL ETHICON ENDOPATH VERESS 14G 120MM

## (undated) DEVICE — SUT VICRYL 0 27" UR-6

## (undated) DEVICE — SUT VLOC 180 2-0 9" GS-22 GREEN

## (undated) DEVICE — XI SEAL UNIVERSIAL 5-12MM

## (undated) DEVICE — XI OBTURATOR OPTICAL BLADELESS 8MM

## (undated) DEVICE — SUT VLOC 180 2-0 6" GS-22 GREEN

## (undated) DEVICE — SUT VLOC 180 2-0 12" V-20 GREEN

## (undated) DEVICE — PACK GENERAL LAPAROSCOPY

## (undated) DEVICE — NDL HYPO SAFE 22G X 1.5" (BLACK)

## (undated) DEVICE — SUT MONOCRYL 4-0 27" PS-2 UNDYED

## (undated) DEVICE — ELCTR STRYKER NEPTUNE SMOKE EVACUATION PENCIL (GREEN)

## (undated) DEVICE — XI ARM SCISSOR MONO CURVED

## (undated) DEVICE — XI ARM FORCEP FENESTRATED BIPOLAR 8MM

## (undated) DEVICE — DRAPE SPLIT SHEET 77" X 120"

## (undated) DEVICE — D HELP - CLEARVIEW CLEARIFY SYSTEM

## (undated) DEVICE — XI DRAPE COLUMN

## (undated) DEVICE — SUT VLOC 180 0 9" GS-21 GREEN

## (undated) DEVICE — BLADE SURGICAL #15 CARBON

## (undated) DEVICE — GLV 7 PROTEXIS (WHITE)

## (undated) DEVICE — SUT VLOC 180 0 18" GS-21 GREEN

## (undated) DEVICE — DRAPE 3/4 SHEET 52X76"

## (undated) DEVICE — XI DRAPE ARM

## (undated) DEVICE — GLV 7.5 PROTEXIS (BLUE)

## (undated) DEVICE — XI TIP COVER

## (undated) DEVICE — XI ARM NEEDLE DRIVER LARGE